# Patient Record
Sex: MALE | Race: BLACK OR AFRICAN AMERICAN | NOT HISPANIC OR LATINO | URBAN - METROPOLITAN AREA
[De-identification: names, ages, dates, MRNs, and addresses within clinical notes are randomized per-mention and may not be internally consistent; named-entity substitution may affect disease eponyms.]

---

## 2020-04-28 ENCOUNTER — EMERGENCY (EMERGENCY)
Facility: HOSPITAL | Age: 30
LOS: 1 days | Discharge: HOME | End: 2020-04-28
Admitting: EMERGENCY MEDICINE
Payer: MEDICAID

## 2020-04-28 VITALS
SYSTOLIC BLOOD PRESSURE: 129 MMHG | TEMPERATURE: 98 F | OXYGEN SATURATION: 99 % | DIASTOLIC BLOOD PRESSURE: 73 MMHG | HEIGHT: 71 IN | HEART RATE: 81 BPM | WEIGHT: 184.97 LBS | RESPIRATION RATE: 16 BRPM

## 2020-04-28 LAB
ALBUMIN SERPL ELPH-MCNC: 4 G/DL — SIGNIFICANT CHANGE UP (ref 3.4–5)
ALP SERPL-CCNC: 103 U/L — SIGNIFICANT CHANGE UP (ref 40–120)
ALT FLD-CCNC: 42 U/L — SIGNIFICANT CHANGE UP (ref 12–42)
ANION GAP SERPL CALC-SCNC: 6 MMOL/L — LOW (ref 9–16)
AST SERPL-CCNC: 32 U/L — SIGNIFICANT CHANGE UP (ref 15–37)
BILIRUB SERPL-MCNC: 0.7 MG/DL — SIGNIFICANT CHANGE UP (ref 0.2–1.2)
BUN SERPL-MCNC: 28 MG/DL — HIGH (ref 7–23)
CALCIUM SERPL-MCNC: 8.8 MG/DL — SIGNIFICANT CHANGE UP (ref 8.5–10.5)
CHLORIDE SERPL-SCNC: 109 MMOL/L — HIGH (ref 96–108)
CO2 SERPL-SCNC: 31 MMOL/L — SIGNIFICANT CHANGE UP (ref 22–31)
CREAT SERPL-MCNC: 0.94 MG/DL — SIGNIFICANT CHANGE UP (ref 0.5–1.3)
FERRITIN SERPL-MCNC: 103 NG/ML — SIGNIFICANT CHANGE UP (ref 30–400)
GLUCOSE SERPL-MCNC: 93 MG/DL — SIGNIFICANT CHANGE UP (ref 70–99)
HCT VFR BLD CALC: 48.2 % — SIGNIFICANT CHANGE UP (ref 39–50)
HGB BLD-MCNC: 14.8 G/DL — SIGNIFICANT CHANGE UP (ref 13–17)
MCHC RBC-ENTMCNC: 27.5 PG — SIGNIFICANT CHANGE UP (ref 27–34)
MCHC RBC-ENTMCNC: 30.7 GM/DL — LOW (ref 32–36)
MCV RBC AUTO: 89.4 FL — SIGNIFICANT CHANGE UP (ref 80–100)
NRBC # BLD: 0 /100 WBCS — SIGNIFICANT CHANGE UP (ref 0–0)
PLATELET # BLD AUTO: 199 K/UL — SIGNIFICANT CHANGE UP (ref 150–400)
POTASSIUM SERPL-MCNC: 4.8 MMOL/L — SIGNIFICANT CHANGE UP (ref 3.5–5.3)
POTASSIUM SERPL-SCNC: 4.8 MMOL/L — SIGNIFICANT CHANGE UP (ref 3.5–5.3)
PROT SERPL-MCNC: 7.2 G/DL — SIGNIFICANT CHANGE UP (ref 6.4–8.2)
RBC # BLD: 5.39 M/UL — SIGNIFICANT CHANGE UP (ref 4.2–5.8)
RBC # FLD: 12.6 % — SIGNIFICANT CHANGE UP (ref 10.3–14.5)
SODIUM SERPL-SCNC: 146 MMOL/L — HIGH (ref 132–145)
WBC # BLD: 4 K/UL — SIGNIFICANT CHANGE UP (ref 3.8–10.5)
WBC # FLD AUTO: 4 K/UL — SIGNIFICANT CHANGE UP (ref 3.8–10.5)

## 2020-04-28 PROCEDURE — 99284 EMERGENCY DEPT VISIT MOD MDM: CPT

## 2020-04-28 PROCEDURE — 93971 EXTREMITY STUDY: CPT | Mod: 26,LT

## 2020-04-28 NOTE — ED PROVIDER NOTE - OBJECTIVE STATEMENT
30 y/o male with PMHx of G6PD deficiency and bipolar disorder with manic episodes (on Abilify) presents to ED c/o intermittent left upper leg pain that worsens with ambulation x 2-4 weeks. Patient states pain has increased in intensity and frequency within the past 4 days. Denies any swelling, redness, discoloration, numbness, tingling, or changes in sensation. States he had similar episodes as a child associated with "decreased iron."

## 2020-04-28 NOTE — ED PROVIDER NOTE - PATIENT PORTAL LINK FT
You can access the FollowMyHealth Patient Portal offered by [facility] by registering at the following website: [email or web address]. By joining [email or web address] [email or web address] portal, you will also be able to view your health information using other applications (apps) compatible with our system.

## 2020-04-28 NOTE — ED ADULT NURSE NOTE - NSIMPLEMENTINTERV_GEN_ALL_ED
Implemented All Universal Safety Interventions:  [place] to call system. Call bell, personal items and telephone within reach. Instruct patient to call for assistance. Room bathroom lighting operational. Non-slip footwear when patient is off stretcher. Physically safe environment: no spills, clutter or unnecessary equipment. Stretcher in lowest position, wheels locked, appropriate side rails in place.

## 2020-04-28 NOTE — ED PROVIDER NOTE - CLINICAL SUMMARY MEDICAL DECISION MAKING FREE TEXT BOX
30 y/o M presents to ED c/o atraumatic L upper leg pain.  Pt well appearing, VSS, NAD.  DVT us negative.  Labs obtained.  Pt concerned about his iron level.  Ferritin level is a send out.  Pt advised to call regarding his results in 2-3 business days.

## 2020-04-28 NOTE — ED PROVIDER NOTE - NSFOLLOWUPINSTRUCTIONS_ED_ALL_ED_FT
Hydrate well.  Rest.     Massage leg and stretch regularly.    Return for swelling, increased pain, discoloration or numbness.

## 2020-04-28 NOTE — ED ADULT TRIAGE NOTE - CHIEF COMPLAINT QUOTE
Walk in with c/o left leg pain x 4days, now radiating to foot. Pt states " I think my iron is low from my g6PD syndrome" Denies CP or SOB, no ZAFAR.

## 2020-05-02 DIAGNOSIS — M79.605 PAIN IN LEFT LEG: ICD-10-CM

## 2020-05-02 DIAGNOSIS — Z88.6 ALLERGY STATUS TO ANALGESIC AGENT: ICD-10-CM

## 2020-07-02 ENCOUNTER — EMERGENCY (EMERGENCY)
Facility: HOSPITAL | Age: 30
LOS: 1 days | Discharge: HOME | End: 2020-07-02
Attending: EMERGENCY MEDICINE | Admitting: EMERGENCY MEDICINE
Payer: MEDICAID

## 2020-07-02 VITALS
HEIGHT: 71 IN | OXYGEN SATURATION: 95 % | WEIGHT: 190.04 LBS | SYSTOLIC BLOOD PRESSURE: 129 MMHG | DIASTOLIC BLOOD PRESSURE: 68 MMHG | RESPIRATION RATE: 20 BRPM | HEART RATE: 67 BPM | TEMPERATURE: 98 F

## 2020-07-02 PROCEDURE — 99283 EMERGENCY DEPT VISIT LOW MDM: CPT

## 2020-07-02 NOTE — ED ADULT TRIAGE NOTE - CHIEF COMPLAINT QUOTE
Pt with complaint of bilateral foot pain.  Pt states he is homeless and had shoes that were one size too small.  The shoes caused blisters and corns.  Blisters noted to sole of foot as well as multiple callus.  Pt with history of G6PD.

## 2020-07-03 VITALS
SYSTOLIC BLOOD PRESSURE: 129 MMHG | TEMPERATURE: 98 F | OXYGEN SATURATION: 98 % | HEART RATE: 65 BPM | RESPIRATION RATE: 18 BRPM | DIASTOLIC BLOOD PRESSURE: 81 MMHG

## 2020-07-03 PROBLEM — D75.A GLUCOSE-6-PHOSPHATE DEHYDROGENASE (G6PD) DEFICIENCY WITHOUT ANEMIA: Chronic | Status: ACTIVE | Noted: 2020-04-28

## 2020-07-03 PROBLEM — F31.9 BIPOLAR DISORDER, UNSPECIFIED: Chronic | Status: ACTIVE | Noted: 2020-04-28

## 2020-07-03 RX ORDER — ACETAMINOPHEN 500 MG
975 TABLET ORAL ONCE
Refills: 0 | Status: COMPLETED | OUTPATIENT
Start: 2020-07-03 | End: 2020-07-03

## 2020-07-03 RX ADMIN — Medication 975 MILLIGRAM(S): at 01:19

## 2020-07-03 NOTE — ED ADULT NURSE NOTE - OBJECTIVE STATEMENT
30y male presents to ED c/o bilateral foot pain. Pt states he has blisters on the bottoms of his feet that aren't healing from walking too much. Pt able to ambulate. Skin intact. PMH bipolar disorder. A&Ox4.

## 2020-07-03 NOTE — ED PROVIDER NOTE - OBJECTIVE STATEMENT
30 y.o. male homeless with BL foot pain, blisters and swelling for ill fitting shoes, pt was walking around in shoes which were too small for his feet, he states this blisters area taking a long time to heal. denies fever/chills, no fall no trauma.

## 2020-07-03 NOTE — ED PROVIDER NOTE - PATIENT PORTAL LINK FT
You can access the FollowMyHealth Patient Portal offered by [facility] by registering at the following website: [email or web address]. By joining [employer]’s FollowMyHealth portal, you will also be able to view your health information using other applications (apps) compatible with our system.

## 2020-07-03 NOTE — ED PROVIDER NOTE - NSFOLLOWUPCLINICS_GEN_ALL_ED_FT
[facility]  Podiatry  [address] [place] [address]  Phone: [phone]  Fax:   Follow Up Time:

## 2020-07-03 NOTE — ED PROVIDER NOTE - CLINICAL SUMMARY MEDICAL DECISION MAKING FREE TEXT BOX
foot blisters, pt was given topical wound care cream, barrier cream, no signs of infection, he has better fitting shoes, we gave him personal hygiene products and several pairs of socks

## 2020-07-03 NOTE — ED ADULT NURSE NOTE - CHPI ED NUR SYMPTOMS NEG
no weakness/no back pain/no deformity/no stiffness/no tingling/no fever/no numbness/no bruising/no difficulty bearing weight/no abrasion

## 2020-07-03 NOTE — ED PROVIDER NOTE - PHYSICAL EXAMINATION
VSS in NAD non toxic appearing   B/L feet DPI NVI no cellulitis, mild non pitting edema B/L LE, healing blisters, no open wound, no cellulitis, no fluctuance no abscess
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
## 2020-07-06 DIAGNOSIS — S90.821A BLISTER (NONTHERMAL), RIGHT FOOT, INITIAL ENCOUNTER: ICD-10-CM
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
## 2020-07-06 DIAGNOSIS — M79.671 PAIN IN RIGHT FOOT: ICD-10-CM
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
## 2020-07-06 DIAGNOSIS — S90.822A BLISTER (NONTHERMAL), LEFT FOOT, INITIAL ENCOUNTER: ICD-10-CM

## 2020-07-09 ENCOUNTER — EMERGENCY (EMERGENCY)
Facility: HOSPITAL | Age: 30
LOS: 1 days | Discharge: ANOTHER HOSPITAL | End: 2020-07-09
Attending: EMERGENCY MEDICINE | Admitting: EMERGENCY MEDICINE
Payer: MEDICAID

## 2020-07-09 VITALS
SYSTOLIC BLOOD PRESSURE: 124 MMHG | WEIGHT: 165.35 LBS | RESPIRATION RATE: 17 BRPM | TEMPERATURE: 98 F | HEIGHT: 71 IN | HEART RATE: 79 BPM | OXYGEN SATURATION: 96 % | DIASTOLIC BLOOD PRESSURE: 82 MMHG

## 2020-07-09 DIAGNOSIS — F12.10 CANNABIS ABUSE, UNCOMPLICATED: ICD-10-CM

## 2020-07-09 DIAGNOSIS — F48.9 NONPSYCHOTIC MENTAL DISORDER, UNSPECIFIED: ICD-10-CM

## 2020-07-09 DIAGNOSIS — F31.9 BIPOLAR DISORDER, UNSPECIFIED: ICD-10-CM

## 2020-07-09 LAB
ALBUMIN SERPL ELPH-MCNC: 3.9 G/DL — SIGNIFICANT CHANGE UP (ref 3.4–5)
ALP SERPL-CCNC: 69 U/L — SIGNIFICANT CHANGE UP (ref 40–120)
ALT FLD-CCNC: 40 U/L — SIGNIFICANT CHANGE UP (ref 12–42)
ANION GAP SERPL CALC-SCNC: 7 MMOL/L — LOW (ref 9–16)
APAP SERPL-MCNC: <2 UG/ML — LOW (ref 10–30)
AST SERPL-CCNC: 35 U/L — SIGNIFICANT CHANGE UP (ref 15–37)
BASOPHILS # BLD AUTO: 0.02 K/UL — SIGNIFICANT CHANGE UP (ref 0–0.2)
BASOPHILS NFR BLD AUTO: 0.5 % — SIGNIFICANT CHANGE UP (ref 0–2)
BILIRUB SERPL-MCNC: 1 MG/DL — SIGNIFICANT CHANGE UP (ref 0.2–1.2)
BUN SERPL-MCNC: 10 MG/DL — SIGNIFICANT CHANGE UP (ref 7–23)
CALCIUM SERPL-MCNC: 9.3 MG/DL — SIGNIFICANT CHANGE UP (ref 8.5–10.5)
CHLORIDE SERPL-SCNC: 109 MMOL/L — HIGH (ref 96–108)
CO2 SERPL-SCNC: 31 MMOL/L — SIGNIFICANT CHANGE UP (ref 22–31)
CREAT SERPL-MCNC: 0.88 MG/DL — SIGNIFICANT CHANGE UP (ref 0.5–1.3)
EOSINOPHIL # BLD AUTO: 0.19 K/UL — SIGNIFICANT CHANGE UP (ref 0–0.5)
EOSINOPHIL NFR BLD AUTO: 4.8 % — SIGNIFICANT CHANGE UP (ref 0–6)
ETHANOL SERPL-MCNC: <3 MG/DL — SIGNIFICANT CHANGE UP
GLUCOSE SERPL-MCNC: 92 MG/DL — SIGNIFICANT CHANGE UP (ref 70–99)
HCT VFR BLD CALC: 50.8 % — HIGH (ref 39–50)
HGB BLD-MCNC: 15.7 G/DL — SIGNIFICANT CHANGE UP (ref 13–17)
IMM GRANULOCYTES NFR BLD AUTO: 0 % — SIGNIFICANT CHANGE UP (ref 0–1.5)
LYMPHOCYTES # BLD AUTO: 1.56 K/UL — SIGNIFICANT CHANGE UP (ref 1–3.3)
LYMPHOCYTES # BLD AUTO: 39.2 % — SIGNIFICANT CHANGE UP (ref 13–44)
MCHC RBC-ENTMCNC: 27.6 PG — SIGNIFICANT CHANGE UP (ref 27–34)
MCHC RBC-ENTMCNC: 30.9 GM/DL — LOW (ref 32–36)
MCV RBC AUTO: 89.4 FL — SIGNIFICANT CHANGE UP (ref 80–100)
MONOCYTES # BLD AUTO: 0.26 K/UL — SIGNIFICANT CHANGE UP (ref 0–0.9)
MONOCYTES NFR BLD AUTO: 6.5 % — SIGNIFICANT CHANGE UP (ref 2–14)
NEUTROPHILS # BLD AUTO: 1.95 K/UL — SIGNIFICANT CHANGE UP (ref 1.8–7.4)
NEUTROPHILS NFR BLD AUTO: 49 % — SIGNIFICANT CHANGE UP (ref 43–77)
NRBC # BLD: 0 /100 WBCS — SIGNIFICANT CHANGE UP (ref 0–0)
PCP SPEC-MCNC: SIGNIFICANT CHANGE UP
PLATELET # BLD AUTO: 221 K/UL — SIGNIFICANT CHANGE UP (ref 150–400)
POTASSIUM SERPL-MCNC: 4.5 MMOL/L — SIGNIFICANT CHANGE UP (ref 3.5–5.3)
POTASSIUM SERPL-SCNC: 4.5 MMOL/L — SIGNIFICANT CHANGE UP (ref 3.5–5.3)
PROT SERPL-MCNC: 7.3 G/DL — SIGNIFICANT CHANGE UP (ref 6.4–8.2)
RBC # BLD: 5.68 M/UL — SIGNIFICANT CHANGE UP (ref 4.2–5.8)
RBC # FLD: 12 % — SIGNIFICANT CHANGE UP (ref 10.3–14.5)
SALICYLATES SERPL-MCNC: 0.8 MG/DL — LOW (ref 2.8–20)
SODIUM SERPL-SCNC: 147 MMOL/L — HIGH (ref 132–145)
WBC # BLD: 3.98 K/UL — SIGNIFICANT CHANGE UP (ref 3.8–10.5)
WBC # FLD AUTO: 3.98 K/UL — SIGNIFICANT CHANGE UP (ref 3.8–10.5)

## 2020-07-09 PROCEDURE — 93010 ELECTROCARDIOGRAM REPORT: CPT

## 2020-07-09 PROCEDURE — 90792 PSYCH DIAG EVAL W/MED SRVCS: CPT | Mod: 95

## 2020-07-09 PROCEDURE — 99218: CPT

## 2020-07-09 RX ORDER — ARIPIPRAZOLE 15 MG/1
10 TABLET ORAL ONCE
Refills: 0 | Status: COMPLETED | OUTPATIENT
Start: 2020-07-09 | End: 2020-07-09

## 2020-07-09 RX ADMIN — ARIPIPRAZOLE 10 MILLIGRAM(S): 15 TABLET ORAL at 18:53
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
## 2020-07-09 NOTE — ED PROVIDER NOTE - PROGRESS NOTE DETAILS
endorsed to telepsych telepsych advised voluntary admission. Per telepsych pending covid status for Bed placement. Called telepsych and attempted to expedite bed placement. COVID prelim negative pending official

## 2020-07-09 NOTE — ED PROVIDER NOTE - ATTENDING CONTRIBUTION TO CARE
seen with PA, pt with suicidal ideation, denies drugs, and alcohol, denies attempts, off his ability, normal exam, no withdrawal symptoms, psych consult, voluntary admission
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
## 2020-07-09 NOTE — ED PROVIDER NOTE - OBJECTIVE STATEMENT
29 y/o male here stating he hasn't been able to get his abilify for the past three weeks stating that he lost his medications and his mood has been worse sense. denies chest pain,nausea,vomiting,diarrhea,fevers,chills,sob,traum,loc. Patient appears well NAD stable. Stating today he started having suicidal ideations without a plan. denies HI. AOX3 no hallucinations. Denies drug use
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
## 2020-07-09 NOTE — ED BEHAVIORAL HEALTH ASSESSMENT NOTE - AXIS IV
Problems with interaction with legal system/Problem related to social environment/Housing problems/Problems with primary support/Occupational problems/Economic problems/Problems with access to healthcare services

## 2020-07-09 NOTE — ED BEHAVIORAL HEALTH ASSESSMENT NOTE - PSYCHIATRIC ISSUES AND PLAN (INCLUDE STANDING AND PRN MEDICATION)
Abilify 10mg PO, one dose now at pt's request. Haldol 5mg/ Ativan 2mg/ Benadryl 50mg PO/IM q6h PRN severe agitation
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
## 2020-07-09 NOTE — ED ADULT NURSE REASSESSMENT NOTE - NS ED NURSE REASSESS COMMENT FT1
pt care handed over to myself, introduced self to patient, given ginger ale in cup and  some biscuits and crackers, awaiting eva to talk with pt, pt appears upbeat in mood, good eye contact, interactive

## 2020-07-09 NOTE — ED ADULT NURSE NOTE - OBJECTIVE STATEMENT
Pt in ED with c/o suicidal ideations. Denies active plan. Reports having ran out of meds. Pt calm and cooperative. Placed under constant observation upon arrival

## 2020-07-09 NOTE — ED PROVIDER NOTE - CLINICAL SUMMARY MEDICAL DECISION MAKING FREE TEXT BOX
31 y/o male hx of bipolar now here s/p missing home meds and endorsing new SI.   Endorsed to telepsych and will be a voluntary admission

## 2020-07-09 NOTE — ED BEHAVIORAL HEALTH ASSESSMENT NOTE - SUICIDE RISK FACTORS
Conduct problems current/past/Mood Disorder current/past/History of abuse/trauma/Insomnia/Alcohol/Substance abuse disorders/Current mood episode/Agitation/Severe Anxiety/Panic/Impulsivity/Unable to engage in safety planning/Cluster B Personality disorders or traits current/past

## 2020-07-09 NOTE — ED BEHAVIORAL HEALTH ASSESSMENT NOTE - DESCRIPTION
See BH note See BH note    Vital Signs Last 24 Hrs  T(C): 36.6 (09 Jul 2020 15:54), Max: 36.9 (09 Jul 2020 14:33)  T(F): 97.8 (09 Jul 2020 15:54), Max: 98.4 (09 Jul 2020 14:33)  HR: 56 (09 Jul 2020 15:54) (56 - 79)  BP: 116/75 (09 Jul 2020 15:54) (116/75 - 124/82)  BP(mean): --  RR: 18 (09 Jul 2020 15:54) (17 - 18)  SpO2: 100% (09 Jul 2020 15:54) (96% - 100%) G6PD see HPI. 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

## 2020-07-09 NOTE — ED ADULT TRIAGE NOTE - CHIEF COMPLAINT QUOTE
patient walk in c/o suicidal thoughts without a plan; ran out of abilify meds and needs help getting new prescription; denies HI

## 2020-07-09 NOTE — ED BEHAVIORAL HEALTH NOTE - BEHAVIORAL HEALTH NOTE
===================  PRE-HOSPITAL COURSE  ===================  SOURCE:  Triage documentation.   DETAILS:  Patient ambulated into ED alone; chief complaint of SI w/o plan, running out of medications.     ============  ED COURSE   ============  SOURCE:  PCA, nurse note, and triage documentation.   ARRIVAL:  Patient was cooperative with triage process, no behavioral incidents noted in chart. Patient presents with poor hygiene/malodorous.   BELONGINGS:  Items stowed with security, nothing notable.   BEHAVIOR: Patient has been calm and cooperative while in ED; presents as depressed. Per triage note patient endorses SI without a noted plan, denies HI/AH/VH. Patient's speech is of normal volume/rate accompanied by a logical thought process; patient is AOx3. Patient has been resting in hospital bed in private room awaiting consult.   TREATMENT:  Patient has not required medication intervention while in ED.   VISITORS:  Patient is presently unaccompanied by social supports while in ED.     No noted collateral in chart. ===================  PRE-HOSPITAL COURSE  ===================  SOURCE:  Triage documentation.   DETAILS:  Patient ambulated into ED alone; chief complaint of SI w/o plan, running out of medications.     ============  ED COURSE   ============  SOURCE:  PCA, nurse note, and triage documentation.   ARRIVAL:  Patient was cooperative with triage process, no behavioral incidents noted in chart. Patient presents with poor hygiene/ malodorous.   BELONGINGS:  Items stowed with security, nothing notable.   BEHAVIOR: Patient has been calm and cooperative while in ED; presents as depressed. Per triage note patient endorses SI without a noted plan, denies HI/AH/VH. Patient's speech is of normal volume/rate accompanied by a logical thought process; patient is AOx3. Patient has been resting in hospital bed in private room awaiting consult.   TREATMENT:  Patient has not required medication intervention while in ED.   VISITORS:  Patient is presently unaccompanied by social supports while in ED.     No noted collateral in chart.
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
## 2020-07-09 NOTE — ED CDU PROVIDER INITIAL DAY NOTE - PROGRESS NOTE DETAILS
endorsed to telepsych telepsych advised voluntary admission. Per telepsych pending covid status for Bed placement. comfortable and cooperative. sleeping with constant 1:1 observation in place.  COVID swab negative.  Telepsych aware.  Awaiting bed placement

## 2020-07-09 NOTE — ED BEHAVIORAL HEALTH ASSESSMENT NOTE - ACTIVATING EVENTS/STRESSORS
Inadequate social supports/Triggering events leading to humiliation, shame, and/or despair (e.g. Loss of relationship, financial or health status) (real or anticipated)/Legal problems/Non-compliant or not receiving treatment/Change in provider or treatment (i.e., medications, psychotherapy, milieu)/Current or pending social isolation

## 2020-07-09 NOTE — ED BEHAVIORAL HEALTH ASSESSMENT NOTE - OTHER PAST PSYCHIATRIC HISTORY (INCLUDE DETAILS REGARDING ONSET, COURSE OF ILLNESS, INPATIENT/OUTPATIENT TREATMENT)
see HPI see HPI. Patient reports last manic episode was 2018 during which time he experienced decreased need for sleep for 1 week, [place] a [place]

## 2020-07-10 ENCOUNTER — INPATIENT (INPATIENT)
Facility: HOSPITAL | Age: 30
LOS: 2 days | Discharge: HOME | End: 2020-07-13
Attending: PSYCHIATRY & NEUROLOGY | Admitting: PSYCHIATRY & NEUROLOGY
Payer: COMMERCIAL

## 2020-07-10 VITALS
HEART RATE: 68 BPM | DIASTOLIC BLOOD PRESSURE: 74 MMHG | RESPIRATION RATE: 18 BRPM | OXYGEN SATURATION: 98 % | TEMPERATURE: 98 F | SYSTOLIC BLOOD PRESSURE: 128 MMHG

## 2020-07-10 VITALS — TEMPERATURE: 98 F | RESPIRATION RATE: 17 BRPM | HEIGHT: 71 IN | WEIGHT: 190.92 LBS

## 2020-07-10 DIAGNOSIS — F33.9 MAJOR DEPRESSIVE DISORDER, RECURRENT, UNSPECIFIED: ICD-10-CM

## 2020-07-10 LAB
SARS-COV-2 RNA SPEC QL NAA+PROBE: SIGNIFICANT CHANGE UP
SARS-COV-2 RNA SPEC QL NAA+PROBE: SIGNIFICANT CHANGE UP

## 2020-07-10 PROCEDURE — 99217: CPT

## 2020-07-10 PROCEDURE — 99222 1ST HOSP IP/OBS MODERATE 55: CPT | Mod: GC

## 2020-07-10 RX ORDER — POLYETHYLENE GLYCOL 3350 17 G/17G
17 POWDER, FOR SOLUTION ORAL DAILY
Refills: 0 | Status: DISCONTINUED | OUTPATIENT
Start: 2020-07-10 | End: 2020-07-13

## 2020-07-10 RX ORDER — ARIPIPRAZOLE 15 MG/1
15 TABLET ORAL ONCE
Refills: 0 | Status: COMPLETED | OUTPATIENT
Start: 2020-07-10 | End: 2020-07-10

## 2020-07-10 RX ORDER — HALOPERIDOL DECANOATE 100 MG/ML
5 INJECTION INTRAMUSCULAR EVERY 4 HOURS
Refills: 0 | Status: DISCONTINUED | OUTPATIENT
Start: 2020-07-10 | End: 2020-07-13

## 2020-07-10 RX ORDER — ACETAMINOPHEN 500 MG
650 TABLET ORAL EVERY 6 HOURS
Refills: 0 | Status: DISCONTINUED | OUTPATIENT
Start: 2020-07-10 | End: 2020-07-13

## 2020-07-10 RX ORDER — DIPHENHYDRAMINE HCL 50 MG
50 CAPSULE ORAL ONCE
Refills: 0 | Status: DISCONTINUED | OUTPATIENT
Start: 2020-07-10 | End: 2020-07-13

## 2020-07-10 RX ORDER — DIPHENHYDRAMINE HCL 50 MG
50 CAPSULE ORAL ONCE
Refills: 0 | Status: DISCONTINUED | OUTPATIENT
Start: 2020-07-10 | End: 2020-07-10

## 2020-07-10 RX ORDER — DIPHENHYDRAMINE HCL 50 MG
50 CAPSULE ORAL EVERY 4 HOURS
Refills: 0 | Status: DISCONTINUED | OUTPATIENT
Start: 2020-07-10 | End: 2020-07-13

## 2020-07-10 RX ORDER — ARIPIPRAZOLE 15 MG/1
15 TABLET ORAL DAILY
Refills: 0 | Status: DISCONTINUED | OUTPATIENT
Start: 2020-07-11 | End: 2020-07-13

## 2020-07-10 RX ORDER — HALOPERIDOL DECANOATE 100 MG/ML
7.5 INJECTION INTRAMUSCULAR ONCE
Refills: 0 | Status: DISCONTINUED | OUTPATIENT
Start: 2020-07-10 | End: 2020-07-13

## 2020-07-10 RX ORDER — TRAZODONE HCL 50 MG
50 TABLET ORAL AT BEDTIME
Refills: 0 | Status: DISCONTINUED | OUTPATIENT
Start: 2020-07-10 | End: 2020-07-13

## 2020-07-10 RX ORDER — HALOPERIDOL DECANOATE 100 MG/ML
7 INJECTION INTRAMUSCULAR ONCE
Refills: 0 | Status: DISCONTINUED | OUTPATIENT
Start: 2020-07-10 | End: 2020-07-10

## 2020-07-10 RX ADMIN — ARIPIPRAZOLE 15 MILLIGRAM(S): 15 TABLET ORAL at 10:46

## 2020-07-10 NOTE — CHART NOTE - NSCHARTNOTEFT_GEN_A_CORE
Screening Medical Evaluation  Patient Admitted from: [facility] ED    [facility] admitting diagnosis: Recurrent major depressive disorder    PAST MEDICAL & SURGICAL HISTORY:  Bipolar disorder  G6PD deficiency  No significant past surgical history        Allergies    NSAIDs (Unknown)    Intolerances        Social History:     FAMILY HISTORY:  No pertinent family history in first degree relatives      MEDICATIONS  (STANDING):    MEDICATIONS  (PRN):  acetaminophen   Tablet .. 650 milliGRAM(s) Oral every 6 hours PRN Moderate Pain (4 - 6)  diphenhydrAMINE 50 milliGRAM(s) Oral every 4 hours PRN Agitation  diphenhydrAMINE   Injectable 50 milliGRAM(s) IntraMuscular once PRN agitation  haloperidol     Tablet 5 milliGRAM(s) Oral every 4 hours PRN Agitation  haloperidol    Injectable 7.5 milliGRAM(s) IntraMuscular once PRN agitation/aggression  LORazepam     Tablet 2 milliGRAM(s) Oral every 4 hours PRN Agitation  LORazepam   Injectable 3 milliGRAM(s) IntraMuscular once PRN Agitation  polyethylene glycol 3350 17 Gram(s) Oral daily PRN constipation  traZODone 50 milliGRAM(s) Oral at bedtime PRN insomnia      Vital Signs Last 24 Hrs  T(C): 37.1 (10 Jul 2020 19:23), Max: 37.1 (10 Jul 2020 19:23)  T(F): 98.8 (10 Jul 2020 19:23), Max: 98.8 (10 Jul 2020 19:23)  HR: 68 (10 Jul 2020 05:42) (62 - 68)  BP: 128/74 (10 Jul 2020 05:42) (115/72 - 128/74)  BP(mean): --  RR: 17 (10 Jul 2020 09:17) (16 - 18)  SpO2: 98% (10 Jul 2020 05:42) (98% - 98%)  CAPILLARY BLOOD GLUCOSE            PHYSICAL EXAM:  GENERAL: NAD, well-developed  HEAD:  Atraumatic, Normocephalic  EYES: EOMI, PERRLA, conjunctiva and sclera clear  NECK: Supple.  CHEST/LUNG: Clear to auscultation bilaterally; No wheeze  HEART: Regular rate and rhythm; No murmurs, rubs, or gallops  ABDOMEN: Soft, Nontender, Nondistended; Bowel sounds present  EXTREMITIES:  2+ Peripheral Pulses, No clubbing, cyanosis, or edema  PSYCH: AAOx3  NEUROLOGY: non-focal  SKIN: No rashes or lesions    LABS:                        15.7   3.98  )-----------( 221      ( 09 Jul 2020 15:02 )             50.8     07-09    147<H>  |  109<H>  |  10  ----------------------------<  92  4.5   |  31  |  0.88    Ca    9.3      09 Jul 2020 15:02    TPro  7.3  /  Alb  3.9  /  TBili  1.0  /  DBili  x   /  AST  35  /  ALT  40  /  AlkPhos  69  07-09              RADIOLOGY & ADDITIONAL TESTS:    Assessment and Plan:  30 year old male presenting today from [facility] ED to [facility] with admitting diagnosis of Recurrent major depressive disorder with no pertinent PMH. Denies any medical concerns at this time. Denies any fever, chills, headache, chest pain, SOB, abdominal pain, N/V/D/C, dysuria. Vitals WNL. Physical exam unremarkable. COVID19 negative.  1) Recurrent major depressive disorder: Follow care plan as per primary team.

## 2020-07-10 NOTE — ED CDU PROVIDER DISPOSITION NOTE - ATTENDING CONTRIBUTION TO CARE
pt seen by psych for SI, 1:1 observation in ED, medically cleared by previous day team, now pending [facility] voluntary admission, antwon throughout ED stay

## 2020-07-10 NOTE — ED ADULT NURSE REASSESSMENT NOTE - NS ED NURSE REASSESS COMMENT FT1
Received patient from RAZA Quijano. Patient is calm, cooperative, sleeping in bed in no acute distress. Repeat COVID swab sent to lab, patient verbalizes understanding of plan of care. Pending bed, 1:1 constnat observation maintained, safety measures in place, will continue to monitor.
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
## 2020-07-11 PROCEDURE — 99231 SBSQ HOSP IP/OBS SF/LOW 25: CPT

## 2020-07-11 RX ADMIN — ARIPIPRAZOLE 15 MILLIGRAM(S): 15 TABLET ORAL at 09:17

## 2020-07-12 VITALS — TEMPERATURE: 99 F

## 2020-07-12 PROCEDURE — 99231 SBSQ HOSP IP/OBS SF/LOW 25: CPT

## 2020-07-13 DIAGNOSIS — Z20.828 CONTACT WITH AND (SUSPECTED) EXPOSURE TO OTHER VIRAL COMMUNICABLE DISEASES: ICD-10-CM

## 2020-07-13 DIAGNOSIS — R45.851 SUICIDAL IDEATIONS: ICD-10-CM

## 2020-07-13 DIAGNOSIS — Z88.8 ALLERGY STATUS TO OTHER DRUGS, MEDICAMENTS AND BIOLOGICAL SUBSTANCES STATUS: ICD-10-CM

## 2020-07-13 PROCEDURE — 99238 HOSP IP/OBS DSCHRG MGMT 30/<: CPT | Mod: GC

## 2020-07-13 RX ORDER — ARIPIPRAZOLE 15 MG/1
1 TABLET ORAL
Qty: 30 | Refills: 0
Start: 2020-07-13 | End: 2020-08-11

## 2020-07-31 ENCOUNTER — EMERGENCY (EMERGENCY)
Facility: HOSPITAL | Age: 30
LOS: 1 days | Discharge: HOME | End: 2020-07-31
Attending: EMERGENCY MEDICINE | Admitting: EMERGENCY MEDICINE
Payer: MEDICAID

## 2020-07-31 VITALS
HEIGHT: 70 IN | DIASTOLIC BLOOD PRESSURE: 78 MMHG | OXYGEN SATURATION: 97 % | SYSTOLIC BLOOD PRESSURE: 143 MMHG | WEIGHT: 179.9 LBS | HEART RATE: 77 BPM | TEMPERATURE: 98 F | RESPIRATION RATE: 18 BRPM

## 2020-07-31 PROCEDURE — 99282 EMERGENCY DEPT VISIT SF MDM: CPT

## 2020-07-31 RX ORDER — ACETAMINOPHEN 500 MG
650 TABLET ORAL ONCE
Refills: 0 | Status: COMPLETED | OUTPATIENT
Start: 2020-07-31 | End: 2020-07-31

## 2020-07-31 RX ADMIN — Medication 650 MILLIGRAM(S): at 16:45

## 2020-07-31 NOTE — ED PROVIDER NOTE - OBJECTIVE STATEMENT
30 yom pw bl foot pain w/ blister from walking too much.  pt states he has old socks.  no trauma.  no injury.  no pain elsewhere.

## 2020-07-31 NOTE — ED PROVIDER NOTE - PATIENT PORTAL LINK FT
You can access the FollowMyHealth Patient Portal offered by [facility] by registering at the following website: [email or web address]. By joining [employer]’s FollowMyHealth portal, you will also be able to view your health information using other applications (apps) compatible with our system.

## 2020-07-31 NOTE — ED PROVIDER NOTE - CLINICAL SUMMARY MEDICAL DECISION MAKING FREE TEXT BOX
bl foot pain w/ intact blister, no evidence of cellulitis/erysipelas, poor hygiene noted, will provide new socks, no nv compromise

## 2020-07-31 NOTE — ED PROVIDER NOTE - PHYSICAL EXAMINATION
Physical Exam  GEN: Awake, alert, non-toxic appearing, NCAT  EYES: full EOMI,  ENT: External inspection normal, normal voice,   HEAD: atraumatic  NECK: FROM neck, supple,   RESP: no tachypnea, no hypoxia, no resp distress,  MSK: soft compartment of bl foot and FROM  SKIN: pedal pulse intact, cap refill < 2 sec, several intact small (<2cm) blisters noted to bl feet, no gangrene/induration/fluctuance, no erythema,

## 2020-08-04 DIAGNOSIS — M79.672 PAIN IN LEFT FOOT: ICD-10-CM

## 2020-08-04 DIAGNOSIS — M79.671 PAIN IN RIGHT FOOT: ICD-10-CM

## 2020-11-02 ENCOUNTER — EMERGENCY (EMERGENCY)
Facility: HOSPITAL | Age: 30
LOS: 1 days | Discharge: LEFT BEFORE DISCHARGE | End: 2020-11-02
Attending: EMERGENCY MEDICINE | Admitting: EMERGENCY MEDICINE
Payer: MEDICAID

## 2020-11-02 VITALS
TEMPERATURE: 97 F | RESPIRATION RATE: 16 BRPM | HEIGHT: 70 IN | HEART RATE: 54 BPM | WEIGHT: 190.04 LBS | SYSTOLIC BLOOD PRESSURE: 121 MMHG | DIASTOLIC BLOOD PRESSURE: 72 MMHG | OXYGEN SATURATION: 99 %

## 2020-11-02 DIAGNOSIS — Z53.21 PROCEDURE AND TREATMENT NOT CARRIED OUT DUE TO PATIENT LEAVING PRIOR TO BEING SEEN BY HEALTH CARE PROVIDER: ICD-10-CM

## 2020-11-02 PROCEDURE — L9991: CPT

## 2020-11-02 NOTE — ED PROVIDER NOTE - CLINICAL SUMMARY MEDICAL DECISION MAKING FREE TEXT BOX
[place] his [place]. Pt walked out prior to being seen.

## 2020-11-02 NOTE — ED ADULT TRIAGE NOTE - ARRIVAL INFO ADDITIONAL COMMENTS
Patient reports that his feet have been "acting up for a while." Patient attributes it to "walking around too much." Patient reports that he [place] resting at the train station and had to leave because he was being cleared out.

## 2020-11-22 ENCOUNTER — EMERGENCY (EMERGENCY)
Facility: HOSPITAL | Age: 30
LOS: 1 days | Discharge: HOME | End: 2020-11-22
Admitting: STUDENT IN AN ORGANIZED HEALTH CARE EDUCATION/TRAINING PROGRAM
Payer: MEDICAID

## 2020-11-22 VITALS
WEIGHT: 190.04 LBS | TEMPERATURE: 98 F | HEIGHT: 70 IN | RESPIRATION RATE: 16 BRPM | HEART RATE: 78 BPM | OXYGEN SATURATION: 96 % | SYSTOLIC BLOOD PRESSURE: 117 MMHG | DIASTOLIC BLOOD PRESSURE: 78 MMHG

## 2020-11-22 DIAGNOSIS — F31.9 BIPOLAR DISORDER, UNSPECIFIED: ICD-10-CM

## 2020-11-22 PROCEDURE — 99283 EMERGENCY DEPT VISIT LOW MDM: CPT

## 2020-11-22 RX ORDER — ARIPIPRAZOLE 15 MG/1
15 TABLET ORAL DAILY
Refills: 0 | Status: DISCONTINUED | OUTPATIENT
Start: 2020-11-22 | End: 2020-11-26

## 2020-11-22 RX ADMIN — ARIPIPRAZOLE 15 MILLIGRAM(S): 15 TABLET ORAL at 15:52

## 2020-11-22 NOTE — ED PROVIDER NOTE - OBJECTIVE STATEMENT
31yo M with h/o bipolar and schizophrenia on abilify 15mg presents today requesting a dose of his medication. pt has not missed any doses but notes he recently became homeless and recently lost his doctor. denies any SI/HI/AVH. states "It's stable as long as I take my medication."

## 2020-11-22 NOTE — ED PROVIDER NOTE - PATIENT PORTAL LINK FT
You can access the FollowMyHealth Patient Portal offered by [facility] by registering at the following website: [email or web address]. By joining [employer]’s FollowMyHealth portal, you will also be able to view your health information using other applications (apps) compatible with our system.

## 2020-11-22 NOTE — ED PROVIDER NOTE - NSFOLLOWUPINSTRUCTIONS_ED_ALL_ED_FT
RETURN TO ER TOMORROW 11-4 TO SPEAK WITH  REGARDING RESOURCES.     RETURN TO ER FOR ANY THOUGHTS OF HURTING YOURSELF OR OTHERS, HALLUCINATIONS, ANY OTHER NEW OR CONCERNING SYMPTOMS.

## 2020-11-22 NOTE — ED PROVIDER NOTE - CLINICAL SUMMARY MEDICAL DECISION MAKING FREE TEXT BOX
pt presents requesting dose of home medication. given home dose of abilify. pt will return tomorrow to speak with social work regarding resources. denies si/hi.

## 2020-11-22 NOTE — ED PROVIDER NOTE - PHYSICAL EXAMINATION
Constitutional: Well appearing, awake, alert, oriented to person, place, time/situation and in no apparent distress.  HEENT: Airway patent, NCAT  Resp: no conversational dyspnea, tachypnea, or signs of respiratory distress  Msk: ambulating with normal steady gait  Neuro: A&Ox3   Psych: denies si/hi, flat affect

## 2021-01-09 ENCOUNTER — EMERGENCY (EMERGENCY)
Facility: HOSPITAL | Age: 31
LOS: 1 days | Discharge: HOME | End: 2021-01-09
Attending: EMERGENCY MEDICINE | Admitting: EMERGENCY MEDICINE
Payer: MEDICAID

## 2021-01-09 VITALS
WEIGHT: 190.04 LBS | DIASTOLIC BLOOD PRESSURE: 74 MMHG | RESPIRATION RATE: 18 BRPM | TEMPERATURE: 99 F | HEIGHT: 71 IN | HEART RATE: 69 BPM | SYSTOLIC BLOOD PRESSURE: 115 MMHG | OXYGEN SATURATION: 94 %

## 2021-01-09 DIAGNOSIS — R07.89 OTHER CHEST PAIN: ICD-10-CM

## 2021-01-09 PROCEDURE — L9991: CPT

## 2021-01-09 NOTE — ED ADULT TRIAGE NOTE - MEANS OF ARRIVAL
ambulatory Home with OT services. Assist for ADLs and functional mobility as needed from family/home w/ OT home w/ OT/Home with OT services. Assist for ADLs and functional mobility as needed from family. Pt will require transport w/c, platform walker, and tub transfer bench (once MD clears for showering).

## 2021-02-06 NOTE — ED PROVIDER NOTE - NSFOLLOWUPINSTRUCTIONS_ED_ALL_ED_FT
Follow up with your primary care doctor or clinics listed below if you do not have a doctor  [address] [address]  To make an appointment, call [phone]  [facility]  Address: [address] [place] [id number]  Appointment Center: [phone] ([phone])   Return immediately for any new or worsening symptoms or any new concerns
other

## 2021-02-10 NOTE — ED ADULT TRIAGE NOTE - WEIGHT IN LBS
190 Albendazole Pregnancy And Lactation Text: This medication is Pregnancy Category C and it isn't known if it is safe during pregnancy. It is also excreted in breast milk.

## 2021-04-13 NOTE — ED PROVIDER NOTE - COVID-19 RESULT
LABOR PROGRESS NOTE     Subjective:  Patient comfortable with HENNY infusing. Currently sleeping.     Forebag felt on exam earlier this PM. Head now well applied so forebag ruptured to reveal meconium-stained fluid. NICU will need to be present at delivery.     Objective:  Vitals:    21 0400   BP: 103/72   Pulse: 76   Resp: 16   Temp: 96.8 °F (36 °C)     cEFM: 130/mod/+accels, +intermittent variable decels   Rhodell: q4-7 min   SVE: 7/100/-2 @ 0319    Assessment & Plan:  Cookie Clayton is a 22 year old  at 40w0d, presenting with increasing frequency and intensity of painful contractions, found to be in labor.     - FWB: Category 2   - GBS: negative  - Labor:    - PIT running per protocol. Currently at 6 mU/min.   - Pain: HENNY infusing  - NICU will need to be present at delivery 2/2 Meconium stained fluid     Attending: MD JUAN Morales DO    NEGATIVE

## 2021-09-24 NOTE — ED BEHAVIORAL HEALTH ASSESSMENT NOTE - REFERRED BY
CHIEF COMPLAINT    Chief Complaint   Patient presents with   • Fever 9 Weeks to 74 years       HPI    Patient in previously healthy 16-month-old female presenting with fever.  She recently completed an illness about a week and half ago with fever and cough which resolved, however on Monday child again worsens, diagnosed with ear infection, currently on day 5 amoxicillin, no missed doses.  Since that time, new fevers have started, MAXIMUM TEMPERATURE 103°, fevers began again on Wednesday with no fevers with initial diagnosis of ear infection.    Child is drinking fluids well, eating less than usual, fewer wet diapers than usual but still having wet diapers.  No vomiting.  No rapid breathing, only occasional cough.   sent her home based on a temperature 103.2°, given Tylenol.  Multiple children in  have been RSV positive, previously there was 1 COVID positive child as well.    No other known sick contacts.  No seizure, loss of consciousness, no rashes or skin changes, no tongue swelling, no skin sloughing.    Allergies    ALLERGIES:  No Known Allergies    Current Medications   No current facility-administered medications for this encounter.     Current Outpatient Medications   Medication Sig Dispense Refill   • amoxicillin (AMOXIL) 400 MG/5ML suspension Take 5.3 mLs by mouth 2 times daily for 10 days. 106 mL 0   • acetaminophen (Tylenol Childrens) 160 MG/5ML suspension Take 4.1 mLs by mouth every 6 hours as needed for Fever.     • ibuprofen (CHILDRENS ADVIL) 100 MG/5ML suspension Take 4.4 mLs by mouth every 6 hours as needed for Fever.           Past Medical History    Past Medical History:   Diagnosis Date   • Dehydration    • Jaundice        Surgical History    History reviewed. No pertinent surgical history.    Social History   Social History     Tobacco Use   • Smoking status: Never Smoker   • Smokeless tobacco: Never Used   Vaping Use   • Vaping Use: never used   Substance Use Topics   • Alcohol  use: Never   • Drug use: Never       Family History    Family History   Problem Relation Age of Onset   • Hypertension Maternal Grandmother    • Hypertension Maternal Grandfather        REVIEW OF SYSTEMS    General: No weight change, positive for fever as above  HEENT: No head injury, no difficulty tolerating oral secretions  Neck: No neck stiffness  Chest: No retractions  Cardiac: No pallor, no syncope  Pulmonary: No wheeze  Abdomen: No diarrhea, no constipation, no bloody stools, no emesis  Neuro: No focal deficit, no loss of consciousness  Extremities: No injury or deformity  Skin: No rash      PHYSICAL EXAM     ED Triage Vitals [09/24/21 1031]   ED Triage Vitals Group      Temp (!) 101.1 °F (38.4 °C)      Heart Rate (!) 178      Resp 36      BP       SpO2 95 %      EtCO2 mmHg       Height       Weight 20 lb 4.5 oz (9.2 kg)      Weight Scale Used       BMI (Calculated)       IBW/kg (Calculated)        Vital signs and nursing notes reviewed  General: Alert and responsive, interactive with exam, lying comfortable appearing, held in mother's arms  HEENT: Mucous membranes moist, sclera clear, nares with clear exudates, tolerating oral secretions, TM erythematous and bulging bilaterally, mildly injected posterior oropharynx without exudates  Neck: Supple without nuchal rigidity, shotty cervical adenopathy  Chest: Normal work of breathing, no retractions  Heart: Regular rate and rhythm, no murmur  Pulmonary: Lungs clear to auscultation bilaterally  Abdomen: Soft, nontender, no rebound or guarding  Extremities: No deformity or apparent injury  Neuro: Alert, moves all extremities spontaneously with grossly normal strength and coordination  Skin: Warm, dry, no wounds or rashes     Procedures    MDM  16-month-old female presenting with 3 days of fever, currently completing antibiotics for ear infection, RSV exposures at .    She is nontoxic appearing at this time, febrile, will provide Motrin.  No retractions, no  hypoxia, no meningismus at this time.  Fever less than 5 days, [place] unlikely, also without rashes, skin changes, red tongue, swelling or skin sloughing.  No GI or urinary symptoms reported.    RSV/COVID/flu swab sent, Motrin given.  Anticipatory guidance discussed, recommend PCP follow-up, with return precautions emphasized.  Patient discharged in stable condition.    Labs  No results found for this visit on 09/24/21.    Radiology  No orders to display         Medications   ibuprofen (CHILDRENS ADVIL) 100 MG/5ML suspension 92 mg (92 mg Oral Given 9/24/21 1131)         Consults      Diagnosis:  ED Diagnosis        Final diagnosis    Fever, unspecified fever cause                     Summary of your Discharge Medications      You have not been prescribed any medications.           Follow Up:  Evan Sheriff MD  [address]  [place] [address] [phone]    Schedule an appointment as soon as possible for a visit        Parent/guardian was instructed to return to the ED immediately if symptoms worsen or any new unusual symptoms arise.         Recheck on patient. Discussed with patient's parent/guardian ED findings and plan for discharge. Patient was given ED warnings, discharge instructions, and follow up information to go home with. Patient understands and agrees with plan for discharge. Any questions have been answered.      Closure:  The parent/guardian understands that this is a provisional diagnosis. Provisional diagnosis can and do change. The diagnosis that you are discharged with today is based on the symptoms with which you presented today. If any new symptoms occur or worsen, you should seek immediate attention for re-evaluation.  Any symptoms that persist or fail to completely resolve require further evaluation by your other healthcare provider(s).       MD Hortencia Cool MD  09/24/21 1225     Self

## 2021-12-13 ENCOUNTER — EMERGENCY (EMERGENCY)
Facility: HOSPITAL | Age: 31
LOS: 1 days | Discharge: HOME | End: 2021-12-13
Attending: EMERGENCY MEDICINE | Admitting: EMERGENCY MEDICINE
Payer: MEDICAID

## 2021-12-13 VITALS
DIASTOLIC BLOOD PRESSURE: 70 MMHG | WEIGHT: 214.95 LBS | SYSTOLIC BLOOD PRESSURE: 140 MMHG | RESPIRATION RATE: 18 BRPM | TEMPERATURE: 99 F | HEIGHT: 71 IN | HEART RATE: 93 BPM | OXYGEN SATURATION: 95 %

## 2021-12-13 VITALS
DIASTOLIC BLOOD PRESSURE: 60 MMHG | SYSTOLIC BLOOD PRESSURE: 112 MMHG | HEART RATE: 80 BPM | OXYGEN SATURATION: 97 % | TEMPERATURE: 98 F | RESPIRATION RATE: 20 BRPM

## 2021-12-13 DIAGNOSIS — R19.7 DIARRHEA, UNSPECIFIED: ICD-10-CM

## 2021-12-13 DIAGNOSIS — Z91.010 ALLERGY TO PEANUTS: ICD-10-CM

## 2021-12-13 DIAGNOSIS — F31.9 BIPOLAR DISORDER, UNSPECIFIED: ICD-10-CM

## 2021-12-13 DIAGNOSIS — K52.9 NONINFECTIVE GASTROENTERITIS AND COLITIS, UNSPECIFIED: ICD-10-CM

## 2021-12-13 DIAGNOSIS — Z88.6 ALLERGY STATUS TO ANALGESIC AGENT: ICD-10-CM

## 2021-12-13 LAB
ALBUMIN SERPL ELPH-MCNC: 4.4 G/DL — SIGNIFICANT CHANGE UP (ref 3.4–5)
ALP SERPL-CCNC: 93 U/L — SIGNIFICANT CHANGE UP (ref 40–120)
ALT FLD-CCNC: 28 U/L — SIGNIFICANT CHANGE UP (ref 12–42)
ANION GAP SERPL CALC-SCNC: 9 MMOL/L — SIGNIFICANT CHANGE UP (ref 9–16)
AST SERPL-CCNC: 22 U/L — SIGNIFICANT CHANGE UP (ref 15–37)
BASOPHILS # BLD AUTO: 0.02 K/UL — SIGNIFICANT CHANGE UP (ref 0–0.2)
BASOPHILS NFR BLD AUTO: 0.3 % — SIGNIFICANT CHANGE UP (ref 0–2)
BILIRUB SERPL-MCNC: 1.1 MG/DL — SIGNIFICANT CHANGE UP (ref 0.2–1.2)
BUN SERPL-MCNC: 18 MG/DL — SIGNIFICANT CHANGE UP (ref 7–23)
CALCIUM SERPL-MCNC: 9.4 MG/DL — SIGNIFICANT CHANGE UP (ref 8.5–10.5)
CHLORIDE SERPL-SCNC: 104 MMOL/L — SIGNIFICANT CHANGE UP (ref 96–108)
CO2 SERPL-SCNC: 30 MMOL/L — SIGNIFICANT CHANGE UP (ref 22–31)
CREAT SERPL-MCNC: 1.14 MG/DL — SIGNIFICANT CHANGE UP (ref 0.5–1.3)
EOSINOPHIL # BLD AUTO: 0.03 K/UL — SIGNIFICANT CHANGE UP (ref 0–0.5)
EOSINOPHIL NFR BLD AUTO: 0.4 % — SIGNIFICANT CHANGE UP (ref 0–6)
GLUCOSE SERPL-MCNC: 82 MG/DL — SIGNIFICANT CHANGE UP (ref 70–99)
HCT VFR BLD CALC: 48.2 % — SIGNIFICANT CHANGE UP (ref 39–50)
HGB BLD-MCNC: 14.7 G/DL — SIGNIFICANT CHANGE UP (ref 13–17)
IMM GRANULOCYTES NFR BLD AUTO: 0.4 % — SIGNIFICANT CHANGE UP (ref 0–1.5)
LIDOCAIN IGE QN: 58 U/L — LOW (ref 73–393)
LYMPHOCYTES # BLD AUTO: 0.53 K/UL — LOW (ref 1–3.3)
LYMPHOCYTES # BLD AUTO: 6.8 % — LOW (ref 13–44)
MCHC RBC-ENTMCNC: 27 PG — SIGNIFICANT CHANGE UP (ref 27–34)
MCHC RBC-ENTMCNC: 30.5 GM/DL — LOW (ref 32–36)
MCV RBC AUTO: 88.4 FL — SIGNIFICANT CHANGE UP (ref 80–100)
MONOCYTES # BLD AUTO: 0.42 K/UL — SIGNIFICANT CHANGE UP (ref 0–0.9)
MONOCYTES NFR BLD AUTO: 5.4 % — SIGNIFICANT CHANGE UP (ref 2–14)
NEUTROPHILS # BLD AUTO: 6.82 K/UL — SIGNIFICANT CHANGE UP (ref 1.8–7.4)
NEUTROPHILS NFR BLD AUTO: 86.7 % — HIGH (ref 43–77)
NRBC # BLD: 0 /100 WBCS — SIGNIFICANT CHANGE UP (ref 0–0)
PLATELET # BLD AUTO: 274 K/UL — SIGNIFICANT CHANGE UP (ref 150–400)
POTASSIUM SERPL-MCNC: 4.3 MMOL/L — SIGNIFICANT CHANGE UP (ref 3.5–5.3)
POTASSIUM SERPL-SCNC: 4.3 MMOL/L — SIGNIFICANT CHANGE UP (ref 3.5–5.3)
PROT SERPL-MCNC: 8.2 G/DL — SIGNIFICANT CHANGE UP (ref 6.4–8.2)
RBC # BLD: 5.45 M/UL — SIGNIFICANT CHANGE UP (ref 4.2–5.8)
RBC # FLD: 12.4 % — SIGNIFICANT CHANGE UP (ref 10.3–14.5)
SODIUM SERPL-SCNC: 143 MMOL/L — SIGNIFICANT CHANGE UP (ref 132–145)
WBC # BLD: 7.85 K/UL — SIGNIFICANT CHANGE UP (ref 3.8–10.5)
WBC # FLD AUTO: 7.85 K/UL — SIGNIFICANT CHANGE UP (ref 3.8–10.5)

## 2021-12-13 PROCEDURE — 99284 EMERGENCY DEPT VISIT MOD MDM: CPT

## 2021-12-13 RX ORDER — ONDANSETRON 8 MG/1
4 TABLET, FILM COATED ORAL ONCE
Refills: 0 | Status: COMPLETED | OUTPATIENT
Start: 2021-12-13 | End: 2021-12-13

## 2021-12-13 RX ORDER — SODIUM CHLORIDE 9 MG/ML
1000 INJECTION INTRAMUSCULAR; INTRAVENOUS; SUBCUTANEOUS ONCE
Refills: 0 | Status: COMPLETED | OUTPATIENT
Start: 2021-12-13 | End: 2021-12-13

## 2021-12-13 RX ORDER — FAMOTIDINE 10 MG/ML
20 INJECTION INTRAVENOUS ONCE
Refills: 0 | Status: COMPLETED | OUTPATIENT
Start: 2021-12-13 | End: 2021-12-13

## 2021-12-13 RX ADMIN — FAMOTIDINE 20 MILLIGRAM(S): 10 INJECTION INTRAVENOUS at 19:54

## 2021-12-13 RX ADMIN — Medication 30 MILLILITER(S): at 19:54

## 2021-12-13 RX ADMIN — SODIUM CHLORIDE 1000 MILLILITER(S): 9 INJECTION INTRAMUSCULAR; INTRAVENOUS; SUBCUTANEOUS at 19:54

## 2021-12-13 RX ADMIN — SODIUM CHLORIDE 1000 MILLILITER(S): 9 INJECTION INTRAMUSCULAR; INTRAVENOUS; SUBCUTANEOUS at 21:11

## 2021-12-13 RX ADMIN — ONDANSETRON 4 MILLIGRAM(S): 8 TABLET, FILM COATED ORAL at 19:54

## 2021-12-13 NOTE — ED PROVIDER NOTE - PHYSICAL EXAMINATION
VITAL SIGNS: I have reviewed nursing notes and confirm.  CONSTITUTIONAL: Well-developed; well-nourished; in no acute distress.  SKIN: Skin exam is warm and dry, no acute rash.  HEAD: Normocephalic; atraumatic.  EYES:  conjunctiva and sclera clear.  ENT: No nasal discharge; airway clear.  NECK: Supple; non tender.  CARD: Regular rate and rhythm.  RESP: Unlabored   ABD: soft; non-distended; non-tender  EXT: Normal ROM. No deformities  LYMPH: No acute cervical adenopathy.  NEURO: Alert, oriented. Grossly unremarkable.  PSYCH: Cooperative, appropriate.

## 2021-12-13 NOTE — ED PROVIDER NOTE - OBJECTIVE STATEMENT
32 y/o M w/no sig pmhx p/w acute onset n/v nbnb emesis and several episodes of watery diarrhea after eating some nuts and raisins given to him by a friend earlier today. Endorsing some immediate crampy abd pain that improved with vomiting. No CP/SOB. No rash. No hx food allergies. States he feels globally fatigued and dehydrated.

## 2021-12-13 NOTE — ED PROVIDER NOTE - CLINICAL SUMMARY MEDICAL DECISION MAKING FREE TEXT BOX
VSS, reassuring exam, goal for sx management, reassessment. VSS, reassuring exam, goal for sx management, reassessment.    Sx resolved in ED, reassuring labs, tolerating PO. d/c home.

## 2021-12-13 NOTE — ED PROVIDER NOTE - PATIENT PORTAL LINK FT
You can access the FollowMyHealth Patient Portal offered by [facility] by registering at the following website: [email or web address]. By joining [email or web address]’s FollowMyHealth portal, you will also be able to view your health information using other applications (apps) compatible with our system.

## 2021-12-13 NOTE — ED ADULT NURSE REASSESSMENT NOTE - NS ED NURSE REASSESS COMMENT FT1
Transfer of care acknowledged with bedside rounding, lab results reviewed, will continue to monitor.  fluids running by bedside, pt reports "I feel stable", pending lab results

## 2021-12-13 NOTE — ED PROVIDER NOTE - NSFOLLOWUPINSTRUCTIONS_ED_ALL_ED_FT
Gastroenteritis    WHAT YOU NEED TO KNOW:    Gastroenteritis, or stomach flu, is an infection of the stomach and intestines.     Digestive Tract         DISCHARGE INSTRUCTIONS:    Call 911 for any of the following:   •You have trouble breathing or a very fast pulse.          Return to the emergency department if:   •You see blood in your diarrhea.      •You cannot stop vomiting.      •You have not urinated for 12 hours.       •You feel like you are going to faint.      Contact your healthcare provider if:   •You have a fever.      •You continue to vomit or have diarrhea, even after treatment.      •You see worms in your diarrhea.      •Your mouth or eyes are dry. You are not urinating as much or as often.      •You have questions or concerns about your condition or care.      Medicines:   •Medicines may be given to stop vomiting or diarrhea, decrease abdominal cramps, or treat an infection.      •Take your medicine as directed. Contact your healthcare provider if you think your medicine is not helping or if you have side effects. Tell him or her if you are allergic to any medicine. Keep a list of the medicines, vitamins, and herbs you take. Include the amounts, and when and why you take them. Bring the list or the pill bottles to follow-up visits. Carry your medicine list with you in case of an emergency.      Manage your symptoms:   •Drink liquids as directed. Ask your healthcare provider how much liquid to drink each day, and which liquids are best for you. You may also need to drink an oral rehydration solution (ORS). An ORS has the right amounts of sugar, salt, and minerals in water to replace body fluids.      •Eat bland foods. When you feel hungry, begin eating soft, bland foods. Examples are bananas, clear soup, potatoes, and applesauce. Do not have dairy products, alcohol, sugary drinks, or drinks with caffeine until you feel better.      •Rest as much as possible. Slowly start to do more each day when you begin to feel better.      Prevent the spread of gastroenteritis: Gastroenteritis can spread easily. Keep yourself, your family, and your surroundings clean to help prevent the spread of gastroenteritis:   •Wash your hands often. Use soap and water. Wash your hands after you use the bathroom, change a child's diapers, or sneeze. Wash your hands before you prepare or eat food.   Handwashing           •Clean surfaces and do laundry often. Wash your clothes and towels separately from the rest of the laundry. Clean surfaces in your home with antibacterial  or bleach.      •Clean food thoroughly and cook safely. Wash raw vegetables before you cook. Cook meat, fish, and eggs fully. Do not use the same dishes for raw meat as you do for other foods. Refrigerate any leftover food immediately.      •Be aware when you camp or travel. Drink only clean water. Do not drink from rivers or lakes unless you purify or boil the water first. When you travel, drink bottled water and do not add ice. Do not eat fruit that has not been peeled. Do not eat raw fish or meat that is not fully cooked.       Follow up with your doctor as directed: Write down your questions so you remember to ask them during your visits.

## 2021-12-26 ENCOUNTER — EMERGENCY (EMERGENCY)
Facility: HOSPITAL | Age: 31
LOS: 1 days | Discharge: HOME | End: 2021-12-26
Admitting: EMERGENCY MEDICINE
Payer: MEDICAID

## 2021-12-26 VITALS
SYSTOLIC BLOOD PRESSURE: 132 MMHG | OXYGEN SATURATION: 98 % | DIASTOLIC BLOOD PRESSURE: 88 MMHG | TEMPERATURE: 98 F | HEART RATE: 88 BPM | RESPIRATION RATE: 18 BRPM

## 2021-12-26 VITALS
DIASTOLIC BLOOD PRESSURE: 74 MMHG | RESPIRATION RATE: 16 BRPM | WEIGHT: 210.1 LBS | OXYGEN SATURATION: 97 % | TEMPERATURE: 98 F | HEIGHT: 71 IN | SYSTOLIC BLOOD PRESSURE: 118 MMHG | HEART RATE: 83 BPM

## 2021-12-26 DIAGNOSIS — Z91.010 ALLERGY TO PEANUTS: ICD-10-CM

## 2021-12-26 DIAGNOSIS — R11.2 NAUSEA WITH VOMITING, UNSPECIFIED: ICD-10-CM

## 2021-12-26 DIAGNOSIS — K52.9 NONINFECTIVE GASTROENTERITIS AND COLITIS, UNSPECIFIED: ICD-10-CM

## 2021-12-26 DIAGNOSIS — Z88.6 ALLERGY STATUS TO ANALGESIC AGENT: ICD-10-CM

## 2021-12-26 DIAGNOSIS — Z20.822 CONTACT WITH AND (SUSPECTED) EXPOSURE TO COVID-19: ICD-10-CM

## 2021-12-26 LAB
ALBUMIN SERPL ELPH-MCNC: 3.7 G/DL — SIGNIFICANT CHANGE UP (ref 3.4–5)
ALP SERPL-CCNC: 78 U/L — SIGNIFICANT CHANGE UP (ref 40–120)
ALT FLD-CCNC: 42 U/L — SIGNIFICANT CHANGE UP (ref 12–42)
ANION GAP SERPL CALC-SCNC: 8 MMOL/L — LOW (ref 9–16)
APPEARANCE UR: CLEAR — SIGNIFICANT CHANGE UP
AST SERPL-CCNC: 34 U/L — SIGNIFICANT CHANGE UP (ref 15–37)
BASOPHILS # BLD AUTO: 0.01 K/UL — SIGNIFICANT CHANGE UP (ref 0–0.2)
BASOPHILS NFR BLD AUTO: 0.2 % — SIGNIFICANT CHANGE UP (ref 0–2)
BILIRUB SERPL-MCNC: 1.2 MG/DL — SIGNIFICANT CHANGE UP (ref 0.2–1.2)
BILIRUB UR-MCNC: ABNORMAL
BUN SERPL-MCNC: 16 MG/DL — SIGNIFICANT CHANGE UP (ref 7–23)
CALCIUM SERPL-MCNC: 8.3 MG/DL — LOW (ref 8.5–10.5)
CHLORIDE SERPL-SCNC: 105 MMOL/L — SIGNIFICANT CHANGE UP (ref 96–108)
CO2 SERPL-SCNC: 28 MMOL/L — SIGNIFICANT CHANGE UP (ref 22–31)
COLOR SPEC: YELLOW — SIGNIFICANT CHANGE UP
CREAT SERPL-MCNC: 0.83 MG/DL — SIGNIFICANT CHANGE UP (ref 0.5–1.3)
DIFF PNL FLD: NEGATIVE — SIGNIFICANT CHANGE UP
EOSINOPHIL # BLD AUTO: 0.13 K/UL — SIGNIFICANT CHANGE UP (ref 0–0.5)
EOSINOPHIL NFR BLD AUTO: 3 % — SIGNIFICANT CHANGE UP (ref 0–6)
GLUCOSE SERPL-MCNC: 86 MG/DL — SIGNIFICANT CHANGE UP (ref 70–99)
GLUCOSE UR QL: NEGATIVE — SIGNIFICANT CHANGE UP
HCT VFR BLD CALC: 44.9 % — SIGNIFICANT CHANGE UP (ref 39–50)
HGB BLD-MCNC: 13.8 G/DL — SIGNIFICANT CHANGE UP (ref 13–17)
IMM GRANULOCYTES NFR BLD AUTO: 0.5 % — SIGNIFICANT CHANGE UP (ref 0–1.5)
KETONES UR-MCNC: 15 MG/DL
LEUKOCYTE ESTERASE UR-ACNC: NEGATIVE — SIGNIFICANT CHANGE UP
LYMPHOCYTES # BLD AUTO: 0.71 K/UL — LOW (ref 1–3.3)
LYMPHOCYTES # BLD AUTO: 16.6 % — SIGNIFICANT CHANGE UP (ref 13–44)
MAGNESIUM SERPL-MCNC: 1.9 MG/DL — SIGNIFICANT CHANGE UP (ref 1.6–2.6)
MCHC RBC-ENTMCNC: 27 PG — SIGNIFICANT CHANGE UP (ref 27–34)
MCHC RBC-ENTMCNC: 30.7 GM/DL — LOW (ref 32–36)
MCV RBC AUTO: 87.9 FL — SIGNIFICANT CHANGE UP (ref 80–100)
MONOCYTES # BLD AUTO: 0.4 K/UL — SIGNIFICANT CHANGE UP (ref 0–0.9)
MONOCYTES NFR BLD AUTO: 9.3 % — SIGNIFICANT CHANGE UP (ref 2–14)
NEUTROPHILS # BLD AUTO: 3.02 K/UL — SIGNIFICANT CHANGE UP (ref 1.8–7.4)
NEUTROPHILS NFR BLD AUTO: 70.4 % — SIGNIFICANT CHANGE UP (ref 43–77)
NITRITE UR-MCNC: NEGATIVE — SIGNIFICANT CHANGE UP
NRBC # BLD: 0 /100 WBCS — SIGNIFICANT CHANGE UP (ref 0–0)
PCP SPEC-MCNC: SIGNIFICANT CHANGE UP
PH UR: 6.5 — SIGNIFICANT CHANGE UP (ref 5–8)
PLATELET # BLD AUTO: 212 K/UL — SIGNIFICANT CHANGE UP (ref 150–400)
POTASSIUM SERPL-MCNC: 4 MMOL/L — SIGNIFICANT CHANGE UP (ref 3.5–5.3)
POTASSIUM SERPL-SCNC: 4 MMOL/L — SIGNIFICANT CHANGE UP (ref 3.5–5.3)
PROT SERPL-MCNC: 7 G/DL — SIGNIFICANT CHANGE UP (ref 6.4–8.2)
PROT UR-MCNC: ABNORMAL MG/DL
RBC # BLD: 5.11 M/UL — SIGNIFICANT CHANGE UP (ref 4.2–5.8)
RBC # FLD: 12.9 % — SIGNIFICANT CHANGE UP (ref 10.3–14.5)
SARS-COV-2 RNA SPEC QL NAA+PROBE: SIGNIFICANT CHANGE UP
SODIUM SERPL-SCNC: 141 MMOL/L — SIGNIFICANT CHANGE UP (ref 132–145)
SP GR SPEC: 1.02 — SIGNIFICANT CHANGE UP (ref 1–1.03)
UROBILINOGEN FLD QL: 1 E.U./DL — SIGNIFICANT CHANGE UP
WBC # BLD: 4.29 K/UL — SIGNIFICANT CHANGE UP (ref 3.8–10.5)
WBC # FLD AUTO: 4.29 K/UL — SIGNIFICANT CHANGE UP (ref 3.8–10.5)

## 2021-12-26 PROCEDURE — 99284 EMERGENCY DEPT VISIT MOD MDM: CPT

## 2021-12-26 RX ORDER — SODIUM CHLORIDE 9 MG/ML
1000 INJECTION INTRAMUSCULAR; INTRAVENOUS; SUBCUTANEOUS ONCE
Refills: 0 | Status: COMPLETED | OUTPATIENT
Start: 2021-12-26 | End: 2021-12-26

## 2021-12-26 RX ORDER — ONDANSETRON 8 MG/1
4 TABLET, FILM COATED ORAL ONCE
Refills: 0 | Status: COMPLETED | OUTPATIENT
Start: 2021-12-26 | End: 2021-12-26

## 2021-12-26 RX ORDER — FAMOTIDINE 10 MG/ML
20 INJECTION INTRAVENOUS ONCE
Refills: 0 | Status: COMPLETED | OUTPATIENT
Start: 2021-12-26 | End: 2021-12-26

## 2021-12-26 RX ADMIN — FAMOTIDINE 20 MILLIGRAM(S): 10 INJECTION INTRAVENOUS at 01:54

## 2021-12-26 RX ADMIN — SODIUM CHLORIDE 1000 MILLILITER(S): 9 INJECTION INTRAMUSCULAR; INTRAVENOUS; SUBCUTANEOUS at 01:54

## 2021-12-26 RX ADMIN — ONDANSETRON 4 MILLIGRAM(S): 8 TABLET, FILM COATED ORAL at 01:53

## 2021-12-26 NOTE — ED PROVIDER NOTE - OBJECTIVE STATEMENT
32 yo M with PMHx of G6DP deficiency and bipolar disorder, s/p 2 doses of pfizer, presenting c/o N/V/D x 1d. Pt reports having some food from the soup kitchen today and noted 2 episodes of NBNB emesis, one episode of loose diarrhea, abdominal bloating and cramping and decreased po intake. Denies fever, chills, melena, hematochezia, hematuria, change in urinary/bowel function, dysuria, penile d/c, flank pain, HA, dizziness, focal weakness, CP, SOB, palpitations, cough, and malaise. No recent travel or sick contact noted

## 2021-12-26 NOTE — ED PROVIDER NOTE - CARE PROVIDERS DIRECT ADDRESSES
,DirectAddress_Unknown,[email or web address]

## 2021-12-26 NOTE — ED PROVIDER NOTE - CLINICAL SUMMARY MEDICAL DECISION MAKING FREE TEXT BOX
pt p/w 1d of N/V/D after having something from soup kitchen, abd soft, afebrile, AFVSS, labs and urine unremarkable, s/p IVF and GI cocktail with improvement, tolerating po in the ED, likely AGE, BRAT diet PRN, pt verbalized understanding

## 2021-12-26 NOTE — ED PROVIDER NOTE - CARE PROVIDER_API CALL
Mohit Nelson)  Gastroenterology; Internal Medicine  [address] [address]  Phone: [phone]  Fax: [phone]  Follow Up Time:     Andrey Hart)  Internal Medicine  [address] [place] [id number]  Phone: [phone]  Fax: [phone]  Follow Up Time:

## 2021-12-26 NOTE — ED PROVIDER NOTE - PATIENT PORTAL LINK FT
You can access the FollowMyHealth Patient Portal offered by [facility] by registering at the following website: [email or web address]. By joining [employer]’s FollowMyHealth portal, you will also be able to view your health information using other applications (apps) compatible with our system.

## 2021-12-26 NOTE — ED PROVIDER NOTE - PHYSICAL EXAMINATION
Gen - WDWN M, NAD, comfortable and non-toxic appearing  Skin - warm, dry, intact   HEENT - AT/NC, airway patent, neck supple   CV - S1S2, R/R/R  Resp - CTAB, no r/r/w  GI - soft, ND, NT, no CVAT b/l   MS - w/w/p, no c/c/e, FROM, NV intact, +SILT, compartment soft  Neuro - AxOx3, no focal neuro deficits, ambulatory without gait disturbance

## 2022-03-24 ENCOUNTER — EMERGENCY (EMERGENCY)
Facility: HOSPITAL | Age: 32
LOS: 1 days | Discharge: HOME | End: 2022-03-24
Attending: EMERGENCY MEDICINE | Admitting: EMERGENCY MEDICINE
Payer: MEDICAID

## 2022-03-24 VITALS
SYSTOLIC BLOOD PRESSURE: 155 MMHG | HEART RATE: 82 BPM | HEIGHT: 71 IN | DIASTOLIC BLOOD PRESSURE: 76 MMHG | OXYGEN SATURATION: 99 % | TEMPERATURE: 99 F | WEIGHT: 179.9 LBS | RESPIRATION RATE: 18 BRPM

## 2022-03-24 PROCEDURE — 99283 EMERGENCY DEPT VISIT LOW MDM: CPT

## 2022-03-24 RX ORDER — NEOMYCIN/POLYMYXIN B/HYDROCORT
2 SUSPENSION, DROPS(FINAL DOSAGE FORM)(ML) OTIC (EAR)
Qty: 1 | Refills: 0
Start: 2022-03-24 | End: 2022-03-30

## 2022-03-24 NOTE — ED PROVIDER NOTE - PATIENT PORTAL LINK FT
You can access the FollowMyHealth Patient Portal offered by [facility] by registering at the following website: [email or web address]. By joining [employer]’s FollowMyHealth portal, you will also be able to view your health information using other applications (apps) compatible with our system.

## 2022-03-24 NOTE — ED ADULT NURSE NOTE - OBJECTIVE STATEMENT
pt a&ox3 c/o ear pain x 5 days. denies drainage or fevers. pt states he is allergic to nsaids but has never taken any

## 2022-03-24 NOTE — ED PROVIDER NOTE - PHYSICAL EXAMINATION
CONSTITUTIONAL: Well appearing, well nourished, awake, alert, oriented to person, place, time/situation and in no apparent distress.  ENT: Airway patent, Nasal mucosa clear. Mouth with normal mucosa.  TMs appear normal in color and with a normal cone of light b/l, no perforation.  Mild erythema to L external canal - no edema.    EYES: Clear bilaterally.  RESPIRATORY: Breathing comfortably with normal RR.  MSK: Range of motion is not limited, no deformities noted.  NEURO: Alert and oriented, no focal deficits.  SKIN: Skin normal color for race, warm, dry and intact. No evidence of rash.  PSYCH: Alert and oriented to person, place, time/situation. normal mood and affect. no apparent risk to self or others.

## 2022-03-24 NOTE — ED ADULT TRIAGE NOTE - CHIEF COMPLAINT QUOTE
pt c/o ear pain x 5 days. denies drainage or fevers. pt states he is allergic to nsaids but has never taken any

## 2022-03-24 NOTE — ED PROVIDER NOTE - OBJECTIVE STATEMENT
Pt is a 32yo M who presents with 5 days of L ear pain.  Pt reports occasionally using paper towels to clean out his ears and thinks he irritated his ear drum by going too far in his ear.  Pain since then has been intermittent and associated with a "popping" sound.  No discharge from ear.  No hearing loss or changes.  No fever or chills.

## 2022-03-24 NOTE — ED PROVIDER NOTE - CLINICAL SUMMARY MEDICAL DECISION MAKING FREE TEXT BOX
Barotrauma vs inflammation vs OE.  Will Rx cortisporin otic and have him f/u with ENT.  Referral made to care coordinator to help pt get an ENT f/u appt.  Pt demonstrates full understanding of f/u plan and need to see ENT.

## 2022-03-24 NOTE — ED PROVIDER NOTE - NSFOLLOWUPINSTRUCTIONS_ED_ALL_ED_FT
-PLEASE FOLLOW-UP WITH ONE OF OUR ENT DOCTORS.  PLEASE CALL [phone] AND ASK FOR MSLuis Eduardo GUAJARDO.  SHE CAN HELP YOU MAKE A FOLLOW-UP APPOINTMENT.  HER HOURS ARE 9AM-5PM MONDAY - FRIDAY.  -TAKE OVER THE COUNTER TYLENOL 650MG BY MOUTH EVERY 4-6 HOURS AS NEEDED FOR PAIN.  DO NOT MIX WITH ALCOHOL OR OTHER PRESCRIPTION MEDICATIONS THAT ALREADY CONTAIN TYLENOL OR ACETAMINOPHEN.   -PLEASE GO TO YOUR PHARMACY TO FILL YOUR PRESCRIPTIONS.  PLEASE START TODAY AND USE AS DIRECTED.  -PLEASE RETURN TO THE ER IMMEDIATELY OR CALL 911 FOR ANY HIGH FEVER, TROUBLE BREATHING, VOMITING, SEVERE PAIN, OR ANY OTHER CONCERNS.

## 2022-03-28 DIAGNOSIS — H92.02 OTALGIA, LEFT EAR: ICD-10-CM

## 2022-03-28 DIAGNOSIS — Z91.010 ALLERGY TO PEANUTS: ICD-10-CM

## 2022-03-28 DIAGNOSIS — Z88.6 ALLERGY STATUS TO ANALGESIC AGENT: ICD-10-CM

## 2022-08-10 ENCOUNTER — EMERGENCY (EMERGENCY)
Facility: HOSPITAL | Age: 32
LOS: 1 days | Discharge: HOME | End: 2022-08-10
Admitting: EMERGENCY MEDICINE

## 2022-08-10 VITALS
OXYGEN SATURATION: 98 % | RESPIRATION RATE: 18 BRPM | HEIGHT: 71 IN | SYSTOLIC BLOOD PRESSURE: 115 MMHG | TEMPERATURE: 98 F | DIASTOLIC BLOOD PRESSURE: 68 MMHG | HEART RATE: 78 BPM

## 2022-08-10 DIAGNOSIS — Z59.00 HOMELESSNESS UNSPECIFIED: ICD-10-CM

## 2022-08-10 DIAGNOSIS — B35.3 TINEA PEDIS: ICD-10-CM

## 2022-08-10 DIAGNOSIS — R20.0 ANESTHESIA OF SKIN: ICD-10-CM

## 2022-08-10 DIAGNOSIS — Z88.6 ALLERGY STATUS TO ANALGESIC AGENT: ICD-10-CM

## 2022-08-10 DIAGNOSIS — Z91.010 ALLERGY TO PEANUTS: ICD-10-CM

## 2022-08-10 PROCEDURE — 99283 EMERGENCY DEPT VISIT LOW MDM: CPT

## 2022-08-10 PROCEDURE — 99053 MED SERV 10PM-8AM 24 HR FAC: CPT

## 2022-08-10 SDOH — ECONOMIC STABILITY - HOUSING INSECURITY: HOMELESSNESS UNSPECIFIED: Z59.00

## 2022-08-10 NOTE — ED ADULT NURSE NOTE - NS ED NOTE  TALK SOMEONE YN
Called michel rounding nurse to let him know that pt. Received 2units of insulin this morning when should not have received none. Michel told nurse to recheck blood sugar and make sure pt. eats. I rechecked blood sugar and was 133. Safe report filled out.    No

## 2022-08-10 NOTE — ED ADULT TRIAGE NOTE - CHIEF COMPLAINT QUOTE
Pt walked in c/o bilateral feet pain. States "I'm having nerve pain and there are cuts on the sole of my feet".

## 2022-08-11 RX ORDER — TERBINAFINE HYDROCHLORIDE 1 G/100G
1 CREAM TOPICAL
Qty: 1 | Refills: 0
Start: 2022-08-11 | End: 2022-08-17

## 2022-08-11 RX ORDER — ACETAMINOPHEN 500 MG
975 TABLET ORAL ONCE
Refills: 0 | Status: COMPLETED | OUTPATIENT
Start: 2022-08-11 | End: 2022-08-11

## 2022-08-11 RX ORDER — GABAPENTIN 400 MG/1
300 CAPSULE ORAL ONCE
Refills: 0 | Status: COMPLETED | OUTPATIENT
Start: 2022-08-11 | End: 2022-08-11

## 2022-08-11 RX ORDER — NYSTATIN CREAM 100000 [USP'U]/G
1 CREAM TOPICAL ONCE
Refills: 0 | Status: COMPLETED | OUTPATIENT
Start: 2022-08-11 | End: 2022-08-11

## 2022-08-11 RX ADMIN — NYSTATIN CREAM 1 APPLICATION(S): 100000 CREAM TOPICAL at 00:53

## 2022-08-11 RX ADMIN — GABAPENTIN 300 MILLIGRAM(S): 400 CAPSULE ORAL at 00:53

## 2022-08-11 RX ADMIN — Medication 975 MILLIGRAM(S): at 00:53

## 2022-08-11 NOTE — ED PROVIDER NOTE - CLINICAL SUMMARY MEDICAL DECISION MAKING FREE TEXT BOX
pt pw tinea pedis on both feet otherwise normal exam, plan anti fungal and follow up with medicine as needed.

## 2022-08-11 NOTE — ED PROVIDER NOTE - OBJECTIVE STATEMENT
31 yo m pw burning on b/l feet ongoing for 3 d in duration with rash on b/l feet worse with ambulation, pt is homeless and tries to change socks frequently. No fevers, trauma or fall.    I have reviewed available current nursing and previous documentation of past medical, surgical, family, and/or social history.

## 2022-08-11 NOTE — ED PROVIDER NOTE - PHYSICAL EXAMINATION
Physical Exam    Vital Signs: I have reviewed the initial vital signs.  Constitutional: well-nourished, appears stated age, no acute distress  Cardiovascular: regular rate, regular rhythm, well-perfused extremities, DP pulse +2 and equal b/l  Musculoskeletal: +rash on b/l feet with tinea pedis rash on b/l feet  Integumentary: warm, dry, no rash  Neurologic: extremities’ motor and sensory functions grossly intact

## 2022-08-11 NOTE — ED PROVIDER NOTE - PATIENT PORTAL LINK FT
You can access the FollowMyHealth Patient Portal offered by [facility] by registering at the following website: [email or web address]. By joining [employer]’s FollowMyHealth portal, you will also be able to view your health information using other applications (apps) compatible with our system.

## 2022-08-11 NOTE — ED PROVIDER NOTE - NSFOLLOWUPCLINICS_GEN_ALL_ED_FT
[facility]  Family Medicine  [address]  [place], [place] [address]  Phone: [phone]  Fax:

## 2023-02-16 NOTE — ED ADULT NURSE NOTE - NS ED NURSE DISCH DISPOSITION
Post-Op Assessment Note    CV Status:  Stable    Pain management: adequate     Mental Status:  Alert and awake   Hydration Status:  Euvolemic   PONV Controlled:  Controlled   Airway Patency:  Patent      Post Op Vitals Reviewed: Yes      Staff: Anesthesiologist         No notable events documented      BP      Temp      Pulse     Resp      SpO2      /69   Pulse 60   Temp 98 °F (36 7 °C) (Temporal)   Resp 16   Ht 5' 3" (1 6 m)   Wt 56 7 kg (125 lb)   SpO2 99%   BMI 22 14 kg/m²
Discharged

## 2023-07-12 NOTE — ED ADULT NURSE NOTE - PAIN: BODY LOCATION
Length Of Therapy: 1.5 years
Patient Reported Weight(Optional But Include Units): 133
Comments: Started 3/2022; previously been on Dupixent in 9365-7530 too.
Detail Level: Zone
Add High Risk Medication Management Associated Diagnosis?: No
bilateral feet pain

## 2023-07-24 NOTE — ED PROVIDER NOTE - NSDCPRINTRESULTS_ED_ALL_ED
Chart reviewed. From home, independent  prior to admission. Patient has insurance. No PCP listed. List added to AVS. No other needs identified at this time. CM available should any new needs arise.
Patient requests all Lab and Radiology Results on their Discharge Instructions

## 2024-12-10 ENCOUNTER — EMERGENCY (EMERGENCY)
Facility: HOSPITAL | Age: 34
LOS: 1 days | Discharge: HOME | End: 2024-12-10
Admitting: EMERGENCY MEDICINE
Payer: MEDICAID

## 2024-12-10 VITALS
DIASTOLIC BLOOD PRESSURE: 68 MMHG | SYSTOLIC BLOOD PRESSURE: 120 MMHG | OXYGEN SATURATION: 100 % | WEIGHT: 179.9 LBS | RESPIRATION RATE: 17 BRPM | HEART RATE: 72 BPM | TEMPERATURE: 98 F

## 2024-12-10 PROCEDURE — 99283 EMERGENCY DEPT VISIT LOW MDM: CPT

## 2024-12-10 NOTE — ED PROVIDER NOTE - PATIENT PORTAL LINK FT
You can access the FollowMyHealth Patient Portal offered by [facility] by registering at the following website: [email or web address]. By joining [employer]’s FollowMyHealth portal, you will also be able to view your health information using other applications (apps) compatible with our system.
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
## 2024-12-10 NOTE — ED PROVIDER NOTE - PHYSICAL EXAMINATION
CONSTITUTIONAL: Well-appearing; well-nourished; in no apparent distress.   	HEAD: Normocephalic; atraumatic.   RUE: abrasion to right 5th knuckle healing appropriately, no surrounding erythema/swelling or discharge, good ROM joints, tendon function intact, good cap refill. dpi. soft compartments.   	NEURO: A & O x 3; face symmetric; grossly unremarkable.   PSYCHOLOGICAL: The patient’s mood and manner are appropriate.

## 2024-12-10 NOTE — ED PROVIDER NOTE - NSFOLLOWUPINSTRUCTIONS_ED_ALL_ED_FT
PLEASE FOLLOW-UP WITH YOUR PRIMARY CARE DOCTOR IN 1-2 DAYS FOR FURTHER EVALUATION.      PLEASE TAKE ALL PAPERWORK FROM TODAY'S VISIT TO YOUR PRIMARY DOCTOR.     IF YOU DO NOT HAVE A PRIMARY CARE DOCTOR PLEASE REFER TO THE OFFICE/CLINIC INFORMATION GIVEN BELOW:    If you do not have a doctor, you can call our referral line to find a doctor that matches your insurance; the number is [phone].     You can also follow up with clinics listed below, if you do not have a doctor:  [address] [address]  To make an appointment, call [phone]    [facility]  Address: [address] [place] [address]  Appointment Center: [phone] ([phone])     To access your record on the patient portal [facility], please visit:  [email or web address]  If you are having difficulties setting this up, call [phone] and someone can assist you over the phone.     PLEASE RETURN TO THE ER IMMEDIATELY OR CALL 501 ANY HIGH FEVER, CHEST PAIN, TROUBLE BREATHING, VOMITING, SEVERE PAIN, OR ANY OTHER CONCERNS

## 2024-12-10 NOTE — ED ADULT NURSE REASSESSMENT NOTE - NS ED NURSE REASSESS COMMENT FT1
Pt walked out before he was dcd by PA, no interventions were done, per PA, he had an old wound which was healing well. RN did not meet this pt.

## 2024-12-11 ENCOUNTER — EMERGENCY (EMERGENCY)
Age: 34
LOS: 1 days | Discharge: HOME | End: 2024-12-11
Attending: EMERGENCY MEDICINE | Admitting: EMERGENCY MEDICINE
Payer: MEDICAID

## 2024-12-11 VITALS
TEMPERATURE: 98 F | HEART RATE: 70 BPM | WEIGHT: 179.9 LBS | SYSTOLIC BLOOD PRESSURE: 119 MMHG | RESPIRATION RATE: 17 BRPM | OXYGEN SATURATION: 99 % | DIASTOLIC BLOOD PRESSURE: 76 MMHG

## 2024-12-11 PROCEDURE — 99285 EMERGENCY DEPT VISIT HI MDM: CPT

## 2024-12-11 PROCEDURE — 99053 MED SERV 10PM-8AM 24 HR FAC: CPT

## 2024-12-11 RX ORDER — MAGNESIUM, ALUMINUM HYDROXIDE 200-200 MG
30 TABLET,CHEWABLE ORAL ONCE
Refills: 0 | Status: COMPLETED | OUTPATIENT
Start: 2024-12-11 | End: 2024-12-11

## 2024-12-12 ENCOUNTER — EMERGENCY (EMERGENCY)
Age: 34
LOS: 1 days | Discharge: HOME | End: 2024-12-12
Attending: EMERGENCY MEDICINE | Admitting: EMERGENCY MEDICINE
Payer: MEDICAID

## 2024-12-12 VITALS
RESPIRATION RATE: 18 BRPM | WEIGHT: 179.9 LBS | HEART RATE: 65 BPM | OXYGEN SATURATION: 98 % | TEMPERATURE: 97 F | DIASTOLIC BLOOD PRESSURE: 76 MMHG | SYSTOLIC BLOOD PRESSURE: 123 MMHG

## 2024-12-12 VITALS
RESPIRATION RATE: 17 BRPM | SYSTOLIC BLOOD PRESSURE: 123 MMHG | DIASTOLIC BLOOD PRESSURE: 84 MMHG | OXYGEN SATURATION: 99 % | HEART RATE: 63 BPM | TEMPERATURE: 98 F

## 2024-12-12 DIAGNOSIS — Z59.00 HOMELESSNESS UNSPECIFIED: ICD-10-CM

## 2024-12-12 DIAGNOSIS — Z91.010 ALLERGY TO PEANUTS: ICD-10-CM

## 2024-12-12 DIAGNOSIS — F31.9 BIPOLAR DISORDER, UNSPECIFIED: ICD-10-CM

## 2024-12-12 DIAGNOSIS — Z88.6 ALLERGY STATUS TO ANALGESIC AGENT: ICD-10-CM

## 2024-12-12 DIAGNOSIS — R10.13 EPIGASTRIC PAIN: ICD-10-CM

## 2024-12-12 LAB
ALBUMIN SERPL ELPH-MCNC: 4.1 G/DL — SIGNIFICANT CHANGE UP (ref 3.4–5)
ALP SERPL-CCNC: 70 U/L — SIGNIFICANT CHANGE UP (ref 40–120)
ALT FLD-CCNC: 23 U/L — SIGNIFICANT CHANGE UP (ref 12–42)
ANION GAP SERPL CALC-SCNC: 5 MMOL/L — LOW (ref 9–16)
AST SERPL-CCNC: 25 U/L — SIGNIFICANT CHANGE UP (ref 15–37)
BASOPHILS # BLD AUTO: 0.05 K/UL — SIGNIFICANT CHANGE UP (ref 0–0.2)
BASOPHILS NFR BLD AUTO: 0.9 % — SIGNIFICANT CHANGE UP (ref 0–2)
BILIRUB SERPL-MCNC: 1.2 MG/DL — SIGNIFICANT CHANGE UP (ref 0.2–1.2)
BUN SERPL-MCNC: 11 MG/DL — SIGNIFICANT CHANGE UP (ref 7–23)
CALCIUM SERPL-MCNC: 9.5 MG/DL — SIGNIFICANT CHANGE UP (ref 8.5–10.5)
CHLORIDE SERPL-SCNC: 106 MMOL/L — SIGNIFICANT CHANGE UP (ref 96–108)
CO2 SERPL-SCNC: 32 MMOL/L — HIGH (ref 22–31)
CREAT SERPL-MCNC: 1.27 MG/DL — SIGNIFICANT CHANGE UP (ref 0.5–1.3)
EGFR: 77 ML/MIN/1.73M2 — SIGNIFICANT CHANGE UP
EGFR: 77 ML/MIN/1.73M2 — SIGNIFICANT CHANGE UP
EOSINOPHIL # BLD AUTO: 0.05 K/UL — SIGNIFICANT CHANGE UP (ref 0–0.5)
EOSINOPHIL NFR BLD AUTO: 0.9 % — SIGNIFICANT CHANGE UP (ref 0–6)
GLUCOSE SERPL-MCNC: 86 MG/DL — SIGNIFICANT CHANGE UP (ref 70–99)
HCT VFR BLD CALC: 46.4 % — SIGNIFICANT CHANGE UP (ref 39–50)
HGB BLD-MCNC: 14.4 G/DL — SIGNIFICANT CHANGE UP (ref 13–17)
IMM GRANULOCYTES NFR BLD AUTO: 0.7 % — SIGNIFICANT CHANGE UP (ref 0–0.9)
LACTATE BLDV-MCNC: 1.2 MMOL/L — SIGNIFICANT CHANGE UP (ref 0.5–2)
LIDOCAIN IGE QN: 24 U/L — SIGNIFICANT CHANGE UP (ref 16–77)
LYMPHOCYTES # BLD AUTO: 1.82 K/UL — SIGNIFICANT CHANGE UP (ref 1–3.3)
LYMPHOCYTES # BLD AUTO: 32.2 % — SIGNIFICANT CHANGE UP (ref 13–44)
MCHC RBC-ENTMCNC: 27.3 PG — SIGNIFICANT CHANGE UP (ref 27–34)
MCHC RBC-ENTMCNC: 31 G/DL — LOW (ref 32–36)
MCV RBC AUTO: 88 FL — SIGNIFICANT CHANGE UP (ref 80–100)
MONOCYTES # BLD AUTO: 0.36 K/UL — SIGNIFICANT CHANGE UP (ref 0–0.9)
MONOCYTES NFR BLD AUTO: 6.4 % — SIGNIFICANT CHANGE UP (ref 2–14)
NEUTROPHILS # BLD AUTO: 3.33 K/UL — SIGNIFICANT CHANGE UP (ref 1.8–7.4)
NEUTROPHILS NFR BLD AUTO: 58.9 % — SIGNIFICANT CHANGE UP (ref 43–77)
NRBC # BLD: 0 /100 WBCS — SIGNIFICANT CHANGE UP (ref 0–0)
NRBC BLD-RTO: 0 /100 WBCS — SIGNIFICANT CHANGE UP (ref 0–0)
PLATELET # BLD AUTO: 281 K/UL — SIGNIFICANT CHANGE UP (ref 150–400)
POTASSIUM SERPL-MCNC: 4.5 MMOL/L — SIGNIFICANT CHANGE UP (ref 3.5–5.3)
POTASSIUM SERPL-SCNC: 4.5 MMOL/L — SIGNIFICANT CHANGE UP (ref 3.5–5.3)
PROT SERPL-MCNC: 7.9 G/DL — SIGNIFICANT CHANGE UP (ref 6.4–8.2)
RBC # BLD: 5.27 M/UL — SIGNIFICANT CHANGE UP (ref 4.2–5.8)
RBC # FLD: 11.9 % — SIGNIFICANT CHANGE UP (ref 10.3–14.5)
SODIUM SERPL-SCNC: 143 MMOL/L — SIGNIFICANT CHANGE UP (ref 132–145)
WBC # BLD: 5.65 K/UL — SIGNIFICANT CHANGE UP (ref 3.8–10.5)
WBC # FLD AUTO: 5.65 K/UL — SIGNIFICANT CHANGE UP (ref 3.8–10.5)

## 2024-12-12 PROCEDURE — 99283 EMERGENCY DEPT VISIT LOW MDM: CPT

## 2024-12-12 PROCEDURE — 99053 MED SERV 10PM-8AM 24 HR FAC: CPT

## 2024-12-12 RX ORDER — MAG HYDROX/ALUMINUM HYD/SIMETH 200-200-20
10 SUSPENSION, ORAL (FINAL DOSE FORM) ORAL
Qty: 210 | Refills: 0
Start: 2024-12-12 | End: 2024-12-18

## 2024-12-12 RX ORDER — MAGNESIUM, ALUMINUM HYDROXIDE 200-200 MG
30 TABLET,CHEWABLE ORAL ONCE
Refills: 0 | Status: COMPLETED | OUTPATIENT
Start: 2024-12-12 | End: 2024-12-12

## 2024-12-12 RX ORDER — SUCRALFATE 1 G
1 TABLET ORAL ONCE
Refills: 0 | Status: COMPLETED | OUTPATIENT
Start: 2024-12-12 | End: 2024-12-12

## 2024-12-12 SDOH — ECONOMIC STABILITY - HOUSING INSECURITY: HOMELESSNESS UNSPECIFIED: Z59.00

## 2024-12-13 DIAGNOSIS — R10.13 EPIGASTRIC PAIN: ICD-10-CM
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
## 2024-12-13 DIAGNOSIS — S61.401A UNSPECIFIED OPEN WOUND OF RIGHT HAND, INITIAL ENCOUNTER: ICD-10-CM

## 2024-12-13 DIAGNOSIS — F31.9 BIPOLAR DISORDER, UNSPECIFIED: ICD-10-CM

## 2024-12-13 DIAGNOSIS — Z88.6 ALLERGY STATUS TO ANALGESIC AGENT: ICD-10-CM

## 2024-12-13 DIAGNOSIS — Z91.010 ALLERGY TO PEANUTS: ICD-10-CM

## 2024-12-13 DIAGNOSIS — Z88.8 ALLERGY STATUS TO OTHER DRUGS, MEDICAMENTS AND BIOLOGICAL SUBSTANCES: ICD-10-CM
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
## 2024-12-17 ENCOUNTER — EMERGENCY (EMERGENCY)
Facility: HOSPITAL | Age: 34
LOS: 1 days | Discharge: HOME | End: 2024-12-17
Admitting: EMERGENCY MEDICINE
Payer: MEDICAID

## 2024-12-17 VITALS
RESPIRATION RATE: 18 BRPM | DIASTOLIC BLOOD PRESSURE: 78 MMHG | OXYGEN SATURATION: 99 % | SYSTOLIC BLOOD PRESSURE: 122 MMHG | HEART RATE: 82 BPM

## 2024-12-17 VITALS
TEMPERATURE: 98 F | DIASTOLIC BLOOD PRESSURE: 70 MMHG | SYSTOLIC BLOOD PRESSURE: 114 MMHG | OXYGEN SATURATION: 98 % | HEART RATE: 90 BPM | RESPIRATION RATE: 16 BRPM

## 2024-12-17 DIAGNOSIS — T78.1XXA OTHER ADVERSE FOOD REACTIONS, NOT ELSEWHERE CLASSIFIED, INITIAL ENCOUNTER: ICD-10-CM

## 2024-12-17 DIAGNOSIS — L50.9 URTICARIA, UNSPECIFIED: ICD-10-CM

## 2024-12-17 DIAGNOSIS — Z91.010 ALLERGY TO PEANUTS: ICD-10-CM

## 2024-12-17 DIAGNOSIS — Z88.6 ALLERGY STATUS TO ANALGESIC AGENT: ICD-10-CM

## 2024-12-17 PROCEDURE — 99284 EMERGENCY DEPT VISIT MOD MDM: CPT

## 2024-12-17 RX ORDER — PREDNISONE 20 MG/1
1 TABLET ORAL
Qty: 3 | Refills: 0
Start: 2024-12-17 | End: 2024-12-19

## 2024-12-17 RX ORDER — FAMOTIDINE 20 MG/1
20 TABLET, FILM COATED ORAL ONCE
Refills: 0 | Status: COMPLETED | OUTPATIENT
Start: 2024-12-17 | End: 2024-12-17

## 2024-12-17 RX ORDER — METHYLPREDNISOLONE SOD SUCC 125 MG
125 VIAL (EA) INJECTION ONCE
Refills: 0 | Status: COMPLETED | OUTPATIENT
Start: 2024-12-17 | End: 2024-12-17

## 2024-12-17 RX ORDER — SODIUM CHLORIDE 9 MG/ML
1000 INJECTION, SOLUTION INTRAMUSCULAR; INTRAVENOUS; SUBCUTANEOUS ONCE
Refills: 0 | Status: COMPLETED | OUTPATIENT
Start: 2024-12-17 | End: 2024-12-17

## 2024-12-17 RX ORDER — DIPHENHYDRAMINE HCL 25 MG
50 CAPSULE ORAL ONCE
Refills: 0 | Status: COMPLETED | OUTPATIENT
Start: 2024-12-17 | End: 2024-12-17

## 2024-12-17 RX ADMIN — Medication 50 MILLIGRAM(S): at 19:47

## 2024-12-17 RX ADMIN — SODIUM CHLORIDE 1000 MILLILITER(S): 9 INJECTION, SOLUTION INTRAMUSCULAR; INTRAVENOUS; SUBCUTANEOUS at 19:42

## 2024-12-17 RX ADMIN — Medication 125 MILLIGRAM(S): at 19:48

## 2024-12-17 RX ADMIN — FAMOTIDINE 20 MILLIGRAM(S): 20 TABLET, FILM COATED ORAL at 19:47

## 2024-12-17 NOTE — ED ADULT NURSE NOTE - BIRTH SEX
October 18, 2021        John Paul Hoffmann  [address] [facility] [address]    To Whom It May Concern:    This is to certify John Paul Hoffmann was evaluated on 10/15/21 and is unable to return to work as he tested positive for COVID-19. He may return to work once he meets the following criteria.    John Paul Hoffmann should self-isolate.  ?  CDC guidelines for return to work are as follows:  · At least 24 hours have passed since fever resolution without use of fever reducing medication and   · Symptoms have improved and  · At least 10 days have passed since symptoms first appeared  · At least 10 days have passed since the collection date for a positive COVID-19 test.     **The loss of taste and smell may persist for weeks or months after recovery and do not need to delay the end of isolation.     Per CDC recommendations, employers should not require a COVID-19 test result or a healthcare provider’s note for employees who are sick to validate their illness, qualify for sick leave, or to return to work.    The Coronavirus is a rapidly evolving situation and recommendations are changing regularly to prevent spread of the disease and further loss of life.    Thank you for your understanding during these unusual times.     Electronically signed by:     Juan Mustafa DO                 [address] [facility] [address]    
          October 18, 2021        John Paul Hoffmann  [address] [facility] [address]    To Whom It May Concern:    This is to certify John Paul Hoffmann was evaluated on 10/18/21 and is unable to return to work.    John Paul Hoffmann should self-isolate.  ?  CDC guidelines for return to work are as follows:  · At least 24 hours have passed since fever resolution without use of fever reducing medication and   · Symptoms have improved and  · At least 10 days have passed since symptoms first appeared  · At least 10 days have passed since the collection date for a positive COVID-19 test.     **The loss of taste and smell may persist for weeks or months after recovery and do not need to delay the end of isolation.     Per CDC recommendations, employers should not require a COVID-19 test result or a healthcare provider’s note for employees who are sick to validate their illness, qualify for sick leave, or to return to work.    The Coronavirus is a rapidly evolving situation and recommendations are changing regularly to prevent spread of the disease and further loss of life.    Thank you for your understanding during these unusual times.     Electronically signed by:     Juan Mustafa DO                 [address] [facility] [address]    
Male

## 2024-12-17 NOTE — ED PROVIDER NOTE - PATIENT PORTAL LINK FT
You can access the FollowMyHealth Patient Portal offered by [facility] by registering at the following website: [email or web address]. By joining [email or web address]’s FollowMyHealth portal, you will also be able to view your health information using other applications (apps) compatible with our system.

## 2024-12-17 NOTE — ED PROVIDER NOTE - PHYSICAL EXAMINATION
General: well developed, well nourished, no distress  Eyes: no scleral injection  Mouth: No swelling in the soft palate  Neck: non-tender, full range of motion, supple.   Respiratory: unlabored breathing, CTAB  Cardiovascular: no central cyanosis, RRR  Musculoskeletal: normal gait.   Extremities: normal range of motion, non-tender.  Neurologic: alert, oriented to person, oriented to place, oriented to time.    Skin: normal color.    Urticaria appreciated on chest  Psychiatric: normal affect, normal insight, normal concentration

## 2024-12-17 NOTE — ED PROVIDER NOTE - PROGRESS NOTE DETAILS
Pt is sitting comfortably in room, no acute distress  rash has resolved  Pt is stable for dc  All results reviewed with pt. Pt understands and agrees with plan. Agreed to follow up with pcp in 2-3 days.

## 2024-12-17 NOTE — ED PROVIDER NOTE - OBJECTIVE STATEMENT
34-year-old male, no medical history, presents this emerged from for hives over the past 2 hours on his chest.  Patient states that this occurred after eating a chicken form a sandwich.  Denies past previous episodes. Has not  seen PCP or dermatologist for this. Denies fever, sloughing of skin, new medications, burrows in webs of fingers or toes, palmar or sole involvement, mucosal involvement.  Denies any new soap, laundry detergent, new clothes. Has not tried any new foods that they are aware of.  LMP was … No one else in the same household is exhibiting similar symptoms. Denies respiratory distress.

## 2024-12-17 NOTE — ED PROVIDER NOTE - CLINICAL SUMMARY MEDICAL DECISION MAKING FREE TEXT BOX
34-year-old male presents this emergency department for allergic reaction that occurred about 2 hours prior to arrival  On arrival patient does have urticaria,, however no wheezing appreciated on lung exam no throat closure sensation or swelling of the soft palate appreciated  Will give medications and continue to reassess.

## 2024-12-17 NOTE — ED ADULT TRIAGE NOTE - CHIEF COMPLAINT QUOTE
Pt states ate chicken shwarma this evening and now has hives. Denies trouble breathing or swallowing. Hives to chest, arms and neck.

## 2024-12-31 NOTE — ED ADULT NURSE NOTE - NSIMPLEMENTINTERV_GEN_ALL_ED
[de-identified] : 12.18.24 Mr Nj comes for initial visit after his recent dc from [facility] for TTP/MAHA. He is feeling well overall. He reports some exertional shortness of breath, fatigue.  CBC 12.18.24 : Hb 11.5,    1/2/25  [de-identified] : Mr Abraham is a 78M who presents for initial visit for new Dx of TTP at [facility].   Dx with TTP during hospitalization at [facility]. ADAMTS <1%, + inhibitor.  Received about 13 PLEX sessions, 4cycles of rituximab, stress dose steroids. Hospital course complicated by seizure requiring intubation to protect airway, acute/subacute stroke, Cholecystitis. Discharged with stable levels of plt, on prednisone taper.   Social history: no alcohol/drug use. IADL's  Family history: none significant  [ECOG Performance Status: 0 - Fully active, able to carry on all pre-disease performance without restriction] : Performance Status: 0 - Fully active, able to carry on all pre-disease performance without restriction Implemented All Universal Safety Interventions:  [place] to call system. Call bell, personal items and telephone within reach. Instruct patient to call for assistance. Room bathroom lighting operational. Non-slip footwear when patient is off stretcher. Physically safe environment: no spills, clutter or unnecessary equipment. Stretcher in lowest position, wheels locked, appropriate side rails in place.

## 2025-01-04 ENCOUNTER — EMERGENCY (EMERGENCY)
Facility: HOSPITAL | Age: 35
LOS: 1 days | Discharge: HOME | End: 2025-01-04
Attending: EMERGENCY MEDICINE | Admitting: EMERGENCY MEDICINE
Payer: MEDICAID

## 2025-01-04 VITALS
HEART RATE: 61 BPM | SYSTOLIC BLOOD PRESSURE: 134 MMHG | RESPIRATION RATE: 19 BRPM | DIASTOLIC BLOOD PRESSURE: 77 MMHG | TEMPERATURE: 98 F | OXYGEN SATURATION: 98 %
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
## 2025-01-04 DIAGNOSIS — Z91.010 ALLERGY TO PEANUTS: ICD-10-CM
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
## 2025-01-04 DIAGNOSIS — S62.634A DISPLACED FRACTURE OF DISTAL PHALANX OF RIGHT RING FINGER, INITIAL ENCOUNTER FOR CLOSED FRACTURE: ICD-10-CM

## 2025-01-04 DIAGNOSIS — Z88.6 ALLERGY STATUS TO ANALGESIC AGENT: ICD-10-CM
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
## 2025-01-04 LAB — HIV 1 & 2 AB SERPL IA.RAPID: SIGNIFICANT CHANGE UP

## 2025-01-04 PROCEDURE — 73130 X-RAY EXAM OF HAND: CPT | Mod: 26,RT

## 2025-01-04 PROCEDURE — 99284 EMERGENCY DEPT VISIT MOD MDM: CPT

## 2025-01-04 NOTE — ED PROVIDER NOTE - PHYSICAL EXAMINATION
VITAL SIGNS: I have reviewed nursing notes and confirm.  CONSTITUTIONAL: Well-developed; well-nourished; in no acute distress.  HEAD: Normocephalic; atraumatic.  EYES: EOM intact; conjunctiva and sclera clear.  ENT: nose appears normal  NECK: Supple  RESP: breathing comfortably on RA  EXT: Abnormal curvature of the distal phalanx of the pinky of his right hand.  No evidence of infection to the webspace between the pinky and ring finger.  PSYCH: Cooperative, appropriate.

## 2025-01-04 NOTE — ED PROVIDER NOTE - OBJECTIVE STATEMENT
34-year-old male complaining of a wound to the webspace between his ring finger and pinky on the right hand.  He had sustained an injury several weeks ago during training which he managed on his own.  He noted the wound seemed to be opening a little bit so he wanted to get it evaluated.  He has also noticed the finger is not perfectly straight.  He states that it has been this way since that original injury.  He denies pain with movement of the finger.

## 2025-01-04 NOTE — ED PROVIDER NOTE - CLINICAL SUMMARY MEDICAL DECISION MAKING FREE TEXT BOX
34-year-old male presenting complaining of a small open wound in the webspace between his ring and pinky finger of the right hand in the setting of a previous larger wound that has otherwise healed up.  On exam, there does appear to be dry cracked skin but no evidence of acute infection.  The finger does appear to not be perfectly straight.  Will obtain an x-ray to evaluate for any signs of bony injury.    X-ray reveals a small proximal avulsion fracture which seems to be old.  Will provide hand follow-up.  I do not feel that splinting is necessary given that the injury is not new and there is no way to splint the finger in a way that will bring the avulsed portion back into appropriate alignment.  Upon discussion of these results, the patient is also requesting blood-borne pathogen testing as there was mixing of blood when he originally sustained the injury to the hand.  Will send both HIV and hepatitis screening and discharge.

## 2025-01-04 NOTE — ED PROVIDER NOTE - CARE PROVIDER_API CALL
Dick Villalobos  Plastic Surgery  [address] [place] [id number]  Phone: [phone]  Fax: [phone]  Follow Up Time: 7-10 Days

## 2025-01-04 NOTE — ED ADULT NURSE NOTE - OBJECTIVE STATEMENT
Received patient alert and oriented x 4 denies chest pain or SOB, no cardiac or respiratory distress noted. Patient stated that reason for ER visit is due to  right hand cut near his thumb cut is not bleeding or draining.

## 2025-01-04 NOTE — ED ADULT TRIAGE NOTE - CHIEF COMPLAINT QUOTE
patient here for wound between right 4th and 5th digit; injured on work site over 1 month ago; worried skin is re-opening and eval

## 2025-01-04 NOTE — ED PROVIDER NOTE - PATIENT PORTAL LINK FT
You can access the FollowMyHealth Patient Portal offered by [facility] by registering at the following website: [email or web address]. By joining [employer]’s FollowMyHealth portal, you will also be able to view your health information using other applications (apps) compatible with our system.

## 2025-01-04 NOTE — ED PROVIDER NOTE - HISTORY ATTESTATION, MLM
NEPHROLOGY INTERVAL HPI/OVERNIGHT EVENTS:    feels better   No new complaints     MEDICATIONS  (STANDING):  aspirin  chewable 81 milliGRAM(s) Oral daily  atorvastatin 40 milliGRAM(s) Oral at bedtime  carvedilol 25 milliGRAM(s) Oral every 12 hours  clopidogrel Tablet 75 milliGRAM(s) Oral daily  dextrose 5%. 1000 milliLiter(s) (50 mL/Hr) IV Continuous <Continuous>  dextrose 50% Injectable 12.5 Gram(s) IV Push once  dextrose 50% Injectable 25 Gram(s) IV Push once  dextrose 50% Injectable 25 Gram(s) IV Push once  docusate sodium 100 milliGRAM(s) Oral two times a day  DULoxetine 60 milliGRAM(s) Oral daily  enoxaparin Injectable 40 milliGRAM(s) SubCutaneous daily  epoetin sara Injectable 10183 Unit(s) SubCutaneous <User Schedule>  gabapentin 100 milliGRAM(s) Oral three times a day  insulin glargine Injectable (LANTUS) 7 Unit(s) SubCutaneous at bedtime  insulin lispro (HumaLOG) corrective regimen sliding scale   SubCutaneous three times a day before meals  insulin lispro Injectable (HumaLOG) 3 Unit(s) SubCutaneous three times a day with meals  isosorbide   mononitrate ER Tablet (IMDUR) 60 milliGRAM(s) Oral daily  pantoprazole    Tablet 40 milliGRAM(s) Oral before breakfast  torsemide 40 milliGRAM(s) Oral daily    MEDICATIONS  (PRN):  acetaminophen   Tablet .. 650 milliGRAM(s) Oral every 6 hours PRN Temp greater or equal to 38C (100.4F), Mild Pain (1 - 3)  bisacodyl Suppository 10 milliGRAM(s) Rectal daily PRN Constipation  dextrose 40% Gel 15 Gram(s) Oral once PRN Blood Glucose LESS THAN 70 milliGRAM(s)/deciliter  glucagon  Injectable 1 milliGRAM(s) IntraMuscular once PRN Glucose LESS THAN 70 milligrams/deciliter  lactulose Syrup 10 Gram(s) Oral three times a day PRN colnstipaiton      Allergies    Accupril (Other)    Intolerances      ALLERY AND IMMUNOLOGIC: No hives or eczema      Vital Signs Last 24 Hrs  T(C): 36.5 (24 Dec 2018 10:26), Max: 36.8 (24 Dec 2018 06:00)  T(F): 97.7 (24 Dec 2018 10:26), Max: 98.3 (24 Dec 2018 06:00)  HR: 69 (24 Dec 2018 11:26) (67 - 74)  BP: 110/46 (24 Dec 2018 11:26) (107/53 - 126/60)  BP(mean): --  RR: 17 (24 Dec 2018 11:26) (16 - 24)  SpO2: 100% (24 Dec 2018 11:26) (98% - 100%)  Daily     Daily Weight in k.3 (24 Dec 2018 05:46)  I&O's Detail    23 Dec 2018 07:  -  24 Dec 2018 07:00  --------------------------------------------------------  IN:    Oral Fluid: 720 mL  Total IN: 720 mL    OUT:    Chest Tube: 70 mL    Voided: 1200 mL  Total OUT: 1270 mL    Total NET: -550 mL      24 Dec 2018 07:  -  24 Dec 2018 14:27  --------------------------------------------------------  IN:    Oral Fluid: 680 mL  Total IN: 680 mL    OUT:    Chest Tube: 10 mL    Voided: 750 mL  Total OUT: 760 mL    Total NET: -80 mL        I&O's Summary    23 Dec 2018 07:01  -  24 Dec 2018 07:00  --------------------------------------------------------  IN: 720 mL / OUT: 1270 mL / NET: -550 mL    24 Dec 2018 07:01  -  24 Dec 2018 14:27  --------------------------------------------------------  IN: 680 mL / OUT: 760 mL / NET: -80 mL        PHYSICAL EXAM:  HEENT: same  NECK: Supple, +JVD  NERVOUS SYSTEM:  Alert & Oriented in bed  CHEST/LUNG: Diminished BS right side, no wheezes  HEART: Regular rate and rhythm; no rub  ABDOMEN: Soft, Nontender,+ distention; +BS; + flank edema  EXTREMITIES:   leg edema better    LABS:                        8.0    6.1   )-----------( 210      ( 23 Dec 2018 05:45 )             23.6         141  |  97<L>  |  42.0<H>  ----------------------------<  134<H>  3.8   |  38.0<H>  |  0.92    Ca    8.1<L>      23 Dec 2018 05:45    TPro  6.5<L>  /  Alb  2.4<L>  /  TBili  0.7  /  DBili  x   /  AST  23  /  ALT  17  /  AlkPhos  146<H>                  RADIOLOGY & ADDITIONAL TESTS: I have reviewed and confirmed nurses' notes...

## 2025-01-04 NOTE — ED PROVIDER NOTE - IV ALTEPLASE ADMIN OUTSIDE HIDDEN
Physical Therapy Visit    Visit Type: Daily Treatment Note  Visit: 8  Referring Provider: Brianne Olsen MD  Medical Diagnosis (from order): M43.17 - Spondylolisthesis, lumbosacral region  M54.42, M54.41, G89.29 - Chronic bilateral low back pain with bilateral sciatica     SUBJECTIVE                                                                                                               Back is alright. Saw Dr Wiggins about the spot on his arm that has been bothering, will monitor and try a cream. Plans to schedule more injections with Dr Wiggins  Plans to [place] back to Dr Blackwell to get a referral to Dr Wiggins for perhaps getting PRP injections.   Using heating pad every night, hasn't used TENS lately  Sore with lifting his 1 year old.  Sleep is still not good. Hasn't done mindfulness in a while. He feels tired when he lays down but is restless all night  He and his wife talked the other night on goals to make a plan for his health  Hips and knees bother him more with more activity. Laying down a little bit throughout the day helps  Functional Change: Feeling relief for 3-4 days after therapy    Pain / Symptoms  - Pain rating (out of 10): Current: 6       OBJECTIVE                                                                                                                         Palpation  Left  - Lumbar Paraspinals: tenderness  Right  - Lumbar Paraspinals: tenderness  Lumbar  - L1-L2: - Left: tenderness - Right: tenderness  - L2-L3: - Left: tenderness - Right: tenderness  - L3-L4: - Left: tenderness - Right: tenderness  - L4-L5: - Left: tenderness - Right: tenderness  - L5-S1: - Left: tenderness - Right: tenderness  - SIJ: - Left: tenderness - Right: tenderness               Treatment    Dry Needling:  - Consent signed: yes  - Dry needling used to/for: pain relief and reduced myofascial dysfunction/restriction  - Education about indications, contraindications and potential side effects completed with  patient.  Screen Completed    - Precautions: local skin lesions, lyme disease, local lymphedema, severe hyperalgesia/allodynia, metal allergies: nickel and chromium, abnormal bleeding tendency, immunodeficiency and/or compromised immune system, second or third trimester of pregnancy, vascular disease, history of spontaneous pneumothorax   - Contraindications: local or systemic infections including the flu, over implants, active cancer, area of lymphatic compromise, area of lumpectomy/mastectomy, first trimester of pregnancy      - Location: lumbar longissimus lumborum L3, L4,on left  Needle size: 60 Quantity: 2     - Location: lumbar multifidi L3, L5,on left  Needle size: 50 Quantity: 2    Total Needles Inserted: 4 Removed: 4    Time Out performed at 1310, with patient verifying procedure and consent prior to performing the procedure.    Sign Out performed at 1316, with patient verifying procedure performed and addressing specimens if applicable upon completion of the procedure.      Results: no change in symptoms immediately following  Reaction: no adverse reaction to treatment      Therapeutic Exercise  Eliptical, level 4, 6 minutes    Free motion, anchor 7  Retro walking 5 feet vs 7# x10  Side step 5 feet vs 7# x10 kip, hip/knee pain  Forward walking 5 feet vs 7# x5, sore on the weightbearing leg      Manual Therapy   Prone   Soft tissue mobilization to lumbar paraspinals   Long axis distraction 30 sec on x8 right, 6 left      Therapeutic Activity  Educated on ways to help symptoms,   TENS  Massage  Aquatics  Behavioral health  Sleep hygiene     Skilled input: verbal instruction/cues and as detailed above    Writer verbally educated and received verbal consent for hand placement, positioning of patient, and techniques to be performed today from patient for hand placement and palpation for techniques, therapist position for techniques, modality application and clothing adjustments for techniques as described above  and how they are pertinent to the patient's plan of care.  Home Exercise Program  Access Code: [email or web address]  URL: [email or web address]  Date: 01/19/2024  Prepared by: Ida Maciel    Exercises  - Seated Sciatic Tensioner  - 2 x daily - 1 sets - 10 reps - 1 hold  - Seated Pelvic Tilt  - 2 x daily - 1 sets - 10 reps - 5 hold  - Supine Figure 4 Piriformis Stretch  - 2 x daily - 2 sets - 30 hold  - Supine Piriformis Stretch with Foot on Ground  - 2 x daily - 2 sets - 30 hold  - Lower Trunk Rotations  - 2 x daily - 2 sets - 15 hold  - Supine Transversus Abdominis Bracing - Hands on Stomach  - 2 x daily - 1 sets - 10 reps - 5 hold      ASSESSMENT                                                                                                            Continued conversation on strategies to help improve pain and function at home. Able to increase time on long axis distraction. Still very tender to left lumbar paraspinals, completed dry needling, sore after completion.  Pain/symptoms after session (out of 10): 7  Education:   - Results of above outlined education: Verbalizes understanding and Needs reinforcement    PLAN                                                                                                                           Suggestions for next session as indicated: Progress per plan of care     Long axis distraction  Core and hip strength  Dry needling lumbar/gluteal region  Pain neuroscience education         Therapy procedure time and total treatment time can be found documented on the Time Entry flowsheet     show

## 2025-01-05 LAB
HAV IGM SER-ACNC: SIGNIFICANT CHANGE UP
HBV CORE IGM SER-ACNC: SIGNIFICANT CHANGE UP
HBV SURFACE AB SER-ACNC: ABNORMAL
HBV SURFACE AG SER-ACNC: SIGNIFICANT CHANGE UP
HBV SURFACE AG SER-ACNC: SIGNIFICANT CHANGE UP
HCV AB S/CO SERPL IA: 0.09 S/CO — SIGNIFICANT CHANGE UP (ref 0–0.99)
HCV AB SERPL-IMP: SIGNIFICANT CHANGE UP

## 2025-04-18 ENCOUNTER — EMERGENCY (EMERGENCY)
Facility: HOSPITAL | Age: 35
LOS: 1 days | End: 2025-04-18
Attending: EMERGENCY MEDICINE | Admitting: EMERGENCY MEDICINE
Payer: MEDICAID

## 2025-04-18 VITALS
TEMPERATURE: 98 F | RESPIRATION RATE: 16 BRPM | DIASTOLIC BLOOD PRESSURE: 70 MMHG | HEART RATE: 82 BPM | OXYGEN SATURATION: 98 % | SYSTOLIC BLOOD PRESSURE: 142 MMHG

## 2025-04-18 PROCEDURE — 99053 MED SERV 10PM-8AM 24 HR FAC: CPT

## 2025-04-18 PROCEDURE — 99284 EMERGENCY DEPT VISIT MOD MDM: CPT

## 2025-04-18 RX ORDER — METHYLPREDNISOLONE ACETATE 80 MG/ML
125 INJECTION, SUSPENSION INTRA-ARTICULAR; INTRALESIONAL; INTRAMUSCULAR; SOFT TISSUE ONCE
Refills: 0 | Status: COMPLETED | OUTPATIENT
Start: 2025-04-18 | End: 2025-04-18

## 2025-04-18 RX ORDER — DIPHENHYDRAMINE HCL 12.5MG/5ML
25 ELIXIR ORAL ONCE
Refills: 0 | Status: COMPLETED | OUTPATIENT
Start: 2025-04-18 | End: 2025-04-18

## 2025-04-18 NOTE — ED ADULT TRIAGE NOTE - CHIEF COMPLAINT QUOTE
Pt. walk in for hives to chest and back after having legumes for dinner at 8pm. Pt. has no airway complaints.

## 2025-04-18 NOTE — ED ADULT NURSE NOTE - OBJECTIVE STATEMENT
pt presents to ed for eval of rash to back and chest after eating legumes at dinner. no airway involvement. also reports runny nose x 1 day

## 2025-04-18 NOTE — ED ADULT NURSE NOTE - NSFALLUNIVINTERV_ED_ALL_ED
Bed/Stretcher in lowest position, wheels locked, appropriate side rails in place/Call bell, personal items and telephone in reach/Instruct patient to call for assistance before getting out of bed/chair/stretcher/Non-slip footwear applied when patient is off stretcher/[place] to call system/Physically safe environment - no spills, clutter or unnecessary equipment/Purposeful proactive rounding/Room/bathroom lighting operational, light cord in reach Unknown if ever smoked

## 2025-04-18 NOTE — ED ADULT TRIAGE NOTE - SPO2 (%)
-You have a wound vac at 125 mmHg continuous with black foam. The dressing will be changed twice per week at the wound clinic.    -If you are having issues with your wound vac, please consider patching leaks, changing the canister, or calling [phone] for troubleshooting. If the wound vac has been off or non-operational for over 2 hours, call wound care center to inform them and remove all dressings including black foam and replace with gauze moistened with normal saline.     -Should you experience any significant changes in your wound(s), such as infection (redness, swelling, localized heat, increased pain, fever > 101 F, chills) or have any questions regarding your home care instructions, please contact the wound center at [phone]. If after hours, contact your primary care physician or go to the hospital emergency room.          98

## 2025-04-19 VITALS
SYSTOLIC BLOOD PRESSURE: 116 MMHG | HEART RATE: 73 BPM | OXYGEN SATURATION: 97 % | TEMPERATURE: 98 F | DIASTOLIC BLOOD PRESSURE: 73 MMHG | RESPIRATION RATE: 16 BRPM

## 2025-04-19 LAB
ALBUMIN SERPL ELPH-MCNC: 3.2 G/DL — LOW (ref 3.4–5)
ALP SERPL-CCNC: 73 U/L — SIGNIFICANT CHANGE UP (ref 40–120)
ALT FLD-CCNC: 37 U/L — SIGNIFICANT CHANGE UP (ref 12–42)
ANION GAP SERPL CALC-SCNC: 4 MMOL/L — LOW (ref 9–16)
AST SERPL-CCNC: 30 U/L — SIGNIFICANT CHANGE UP (ref 15–37)
BILIRUB SERPL-MCNC: 0.6 MG/DL — SIGNIFICANT CHANGE UP (ref 0.2–1.2)
BUN SERPL-MCNC: 18 MG/DL — SIGNIFICANT CHANGE UP (ref 7–23)
CALCIUM SERPL-MCNC: 9 MG/DL — SIGNIFICANT CHANGE UP (ref 8.5–10.5)
CHLORIDE SERPL-SCNC: 107 MMOL/L — SIGNIFICANT CHANGE UP (ref 96–108)
CO2 SERPL-SCNC: 32 MMOL/L — HIGH (ref 22–31)
CREAT SERPL-MCNC: 1.19 MG/DL — SIGNIFICANT CHANGE UP (ref 0.5–1.3)
EGFR: 83 ML/MIN/1.73M2 — SIGNIFICANT CHANGE UP
EGFR: 83 ML/MIN/1.73M2 — SIGNIFICANT CHANGE UP
GLUCOSE SERPL-MCNC: 92 MG/DL — SIGNIFICANT CHANGE UP (ref 70–99)
HCT VFR BLD CALC: 40 % — SIGNIFICANT CHANGE UP (ref 39–50)
HGB BLD-MCNC: 12.2 G/DL — LOW (ref 13–17)
MCHC RBC-ENTMCNC: 27.4 PG — SIGNIFICANT CHANGE UP (ref 27–34)
MCHC RBC-ENTMCNC: 30.5 G/DL — LOW (ref 32–36)
MCV RBC AUTO: 89.7 FL — SIGNIFICANT CHANGE UP (ref 80–100)
NRBC # BLD AUTO: 0 K/UL — SIGNIFICANT CHANGE UP (ref 0–0)
NRBC # FLD: 0 K/UL — SIGNIFICANT CHANGE UP (ref 0–0)
NRBC BLD AUTO-RTO: 0 /100 WBCS — SIGNIFICANT CHANGE UP (ref 0–0)
PLATELET # BLD AUTO: 212 K/UL — SIGNIFICANT CHANGE UP (ref 150–400)
PMV BLD: 9.5 FL — SIGNIFICANT CHANGE UP (ref 7–13)
POTASSIUM SERPL-MCNC: 3.9 MMOL/L — SIGNIFICANT CHANGE UP (ref 3.5–5.3)
POTASSIUM SERPL-SCNC: 3.9 MMOL/L — SIGNIFICANT CHANGE UP (ref 3.5–5.3)
PROT SERPL-MCNC: 6.6 G/DL — SIGNIFICANT CHANGE UP (ref 6.4–8.2)
RBC # BLD: 4.46 M/UL — SIGNIFICANT CHANGE UP (ref 4.2–5.8)
RBC # FLD: 12.8 % — SIGNIFICANT CHANGE UP (ref 10.3–14.5)
SODIUM SERPL-SCNC: 143 MMOL/L — SIGNIFICANT CHANGE UP (ref 132–145)
WBC # BLD: 6.18 K/UL — SIGNIFICANT CHANGE UP (ref 3.8–10.5)
WBC # FLD AUTO: 6.18 K/UL — SIGNIFICANT CHANGE UP (ref 3.8–10.5)

## 2025-04-19 RX ADMIN — Medication 20 MILLIGRAM(S): at 00:24

## 2025-04-19 RX ADMIN — Medication 25 MILLIGRAM(S): at 00:24

## 2025-04-19 RX ADMIN — METHYLPREDNISOLONE ACETATE 125 MILLIGRAM(S): 80 INJECTION, SUSPENSION INTRA-ARTICULAR; INTRALESIONAL; INTRAMUSCULAR; SOFT TISSUE at 00:24

## 2025-04-19 NOTE — ED PROVIDER NOTE - NSFOLLOWUPCLINICS_GEN_ALL_ED_FT
[facility]  Allergy  [address] [place] [address]  Phone: [phone]  Fax:

## 2025-04-19 NOTE — ED PROVIDER NOTE - PATIENT PORTAL LINK FT
You can access the FollowMyHealth Patient Portal offered by [facility] by registering at the following website: [email or web address]. By joining [facility]’s FollowMyHealth portal, you will also be able to view your health information using other applications (apps) compatible with our system.

## 2025-04-19 NOTE — ED PROVIDER NOTE - CLINICAL SUMMARY MEDICAL DECISION MAKING FREE TEXT BOX
Pt presents with allergic symptoms but may also have ingested something in his diet which could have cause a G6PD "flare".  Labs drawn which do not show any evidence of hemolytic anemia.  Empirically treated with IV Benadryl, Solumedrol, and Pepcid.  VSS.

## 2025-04-19 NOTE — ED PROVIDER NOTE - NSFOLLOWUPINSTRUCTIONS_ED_ALL_ED_FT
Food Allergy       A food allergy is an abnormal reaction to a food (food allergen) by the body's defense system (immune system). Foods that commonly cause allergies include:    Milk.  Seafood.  Eggs.  Peanuts.  Tree nuts such as pecans, walnuts, and cashews.  Wheat.  Soy.    What are the causes?  Food allergies happen when the immune system sees a food as harmful and releases chemicals (antibodies) to fight it.    What are the signs or symptoms?  Symptoms may be mild or severe. They usually start minutes after eating the food, but they can occur even a few hours later. In people with a severe allergy, symptoms can start within seconds.    Mild symptoms of this condition include:    Congested nose.  Tingling in the mouth.  An itchy, red rash.  Vomiting.  Diarrhea.    In people with a severe allergy, a life-threatening reaction can occur called anaphylaxis. Get help right away if you have symptoms of anaphylaxis, such as:    Feeling warm in the face (flushed). This may include redness.  Itchy, red, swollen areas of skin (hives).  Swelling of the eyes, lips, face, mouth, tongue, or throat.  Difficulty breathing, speaking, or swallowing.  Noisy breathing (wheezing).  Dizziness or light-headedness.  Fainting.  Pain or cramping in the abdomen.    How is this diagnosed?  This condition may be diagnosed based on:    A physical exam.  Your medical history.  Skin tests.  Blood tests.  A food challenge test. This test involves eating the food that may be causing the allergic response while being monitored for a reaction by your health care provider.  The results of an elimination diet. The elimination diet involves removing foods from your diet and then adding them back in, one at a time.  A food diary.    How is this treated?  There is no cure for food allergies. Treatment focuses on preventing exposure to the food or foods you are allergic to and treating reactions if you are exposed to the food. Mild symptoms may not need treatment.     Severe reactions usually need to be treated at a hospital. Treatment may include:    Medicines that help:    Tighten your blood vessels (epinephrine).  Relieve itching and hives (antihistamines).  Widen the narrow and tight airways (bronchodilators).  Reduce swelling (corticosteroids).  Oxygen therapy to help you breathe.  IV fluids to keep you hydrated.    After a severe reaction, you may be given rescue medicines, such as:    An anaphylaxis kit.  An epinephrine injection, commonly called an auto-injector "pen" (pre-filled automatic epinephrine injection device).    Your health care provider may teach you how to use these if you are accidentally exposed to an allergen.    Follow these instructions at home:      If you have a potential allergy:    Follow the elimination diet as told by your health care provider.  Keep a food diary as told by your health care provider. Every day, write down:    What you eat and drink and when.  What symptoms you have and when.        If you have a severe allergy:     Wear a medical alert bracelet or necklace that describes your allergy.  Carry your anaphylaxis kit or an auto-injector pen with you at all times. Use them as told by your health care provider.  Make sure that you, your family members, and your employer know:    The signs of anaphylaxis.  How to use an anaphylaxis kit.  How to use an auto-injector pen.  If you think that you are having an anaphylactic reaction, use your auto-injector pen or anaphylaxis kit.  Replace your auto-injector pen immediately after use, in case you have another reaction.  Get medical care after use your auto-injector pen. This is important because you can have a delayed, life-threatening reaction after taking the medicine (rebound anaphylaxis).        General instructions    Avoid the foods that you are allergic to.  Read food labels before you eat packaged items. Look for ingredients that you are allergic to.  When you are at a restaurant, tell your  that you have an allergy. If you are unsure whether a meal has an ingredient that you are allergic to, ask your .  Take over-the-counter and prescription medicines only as told by your health care provider.     Do not drive until the medicine has worn off, unless your health care provider gives you approval.  Inform all health care providers that you have a food allergy.  If you think that you might be allergic to something else, talk with your health care provider. Do not eat a food to see if you are allergic to it without talking with your health care provider first.    Contact a health care provider if you:  Have symptoms that do not go away within 2 days.  Have symptoms that get worse.  Have new symptoms.    Get help right away if you have symptoms of anaphylaxis:  Flushed skin.  Hives.  Swelling of the eyes, lips, face, mouth, tongue, or throat.  Difficulty breathing, speaking, or swallowing.  Wheezing.  Dizziness or light-headedness.  Fainting.  Pain or cramping in the abdomen.    These symptoms may represent a serious problem that is an emergency. Do not wait to see if the symptoms will go away. Use your auto-injector pen or anaphylaxis kit as you have been told. Get medical help right away. Call your local emergency services (911 in the [place] Do not drive yourself to the hospital.    If you needed to use an auto-injector pen, you need more medical care even if the medicine seems to be helping. This is important because anaphylaxis may happen again within 72 hours.    Summary  A food allergy is an abnormal reaction to a food (food allergen) by the body's defense system (immune system).  There is no cure for food allergies. Treatment focuses on preventing exposure to the food or foods you are allergic to and treating reactions if you are exposed to the food.  Wear a medical alert bracelet or necklace that describes your allergy.  If you have symptoms of anaphylaxis, use your auto-injector pen or anaphylaxis kit as you have been instructed, and get medical help right away.

## 2025-04-19 NOTE — ED PROVIDER NOTE - OBJECTIVE STATEMENT
The patient is a 34 yr old male who is a very poor historian and is very somnolent, admittedly due to the fact that he smoked some marijuana which may have been mixed with another substance on the train earlier this evening.  He states he thinks he is having a food allergy but also claims that he has been diagnosed with a G6PD deficiency in the past, for which he is supposed to avoid certain foods which may trigger reactions.  He does not recall ever being hospitalized for hemolytic anemia in the past.  He states he was eating some beans earlier (cannot recall if they contained bozena beans or not), then he later felt bumps on his skin and generalized pruritus.  No throat swelling, tongue swelling, or difficulty breathing.  No abdominal cramping, nausea, vomiting, or diarrhea.  No chest pain or SOB. No dizziness or syncope. No headache or neurologic symptoms.

## 2025-04-22 DIAGNOSIS — Z88.6 ALLERGY STATUS TO ANALGESIC AGENT: ICD-10-CM

## 2025-04-22 DIAGNOSIS — L29.9 PRURITUS, UNSPECIFIED: ICD-10-CM

## 2025-04-22 DIAGNOSIS — T78.1XXA OTHER ADVERSE FOOD REACTIONS, NOT ELSEWHERE CLASSIFIED, INITIAL ENCOUNTER: ICD-10-CM

## 2025-04-22 DIAGNOSIS — D75.A GLUCOSE-6-PHOSPHATE DEHYDROGENASE (G6PD) DEFICIENCY WITHOUT ANEMIA: ICD-10-CM

## 2025-04-22 DIAGNOSIS — Z91.010 ALLERGY TO PEANUTS: ICD-10-CM
